# Patient Record
Sex: MALE | Race: WHITE | NOT HISPANIC OR LATINO | Employment: OTHER | URBAN - METROPOLITAN AREA
[De-identification: names, ages, dates, MRNs, and addresses within clinical notes are randomized per-mention and may not be internally consistent; named-entity substitution may affect disease eponyms.]

---

## 2023-10-03 ENCOUNTER — ANCILLARY PROCEDURE (OUTPATIENT)
Dept: RADIOLOGY | Facility: CLINIC | Age: 68
End: 2023-10-03
Payer: MEDICARE

## 2023-10-03 DIAGNOSIS — M23.92 UNSPECIFIED INTERNAL DERANGEMENT OF LEFT KNEE: ICD-10-CM

## 2023-10-03 PROCEDURE — 73721 MRI JNT OF LWR EXTRE W/O DYE: CPT | Mod: LT

## 2023-10-03 PROCEDURE — 73721 MRI JNT OF LWR EXTRE W/O DYE: CPT | Mod: LEFT SIDE | Performed by: RADIOLOGY

## 2023-10-07 PROBLEM — R59.0 CERVICAL LYMPHADENOPATHY: Status: ACTIVE | Noted: 2023-10-07

## 2023-10-07 PROBLEM — E04.1 THYROID NODULE: Status: ACTIVE | Noted: 2023-08-07

## 2023-10-07 PROBLEM — K11.20 SUBMANDIBULAR SIALOADENITIS: Status: ACTIVE | Noted: 2023-10-07

## 2023-10-07 PROBLEM — N40.0 BENIGN PROSTATIC HYPERPLASIA WITHOUT LOWER URINARY TRACT SYMPTOMS: Status: ACTIVE | Noted: 2023-08-07

## 2023-10-07 PROBLEM — M79.672 MECHANICAL PAIN OF LEFT FOOT: Status: ACTIVE | Noted: 2023-10-07

## 2023-10-07 PROBLEM — M72.2 PLANTAR FASCIITIS, LEFT: Status: ACTIVE | Noted: 2023-10-07

## 2023-10-07 PROBLEM — R31.9 HEMATURIA: Status: ACTIVE | Noted: 2023-08-07

## 2023-10-07 PROBLEM — J30.9 ALLERGIC RHINITIS: Status: ACTIVE | Noted: 2023-08-07

## 2023-10-07 PROBLEM — E78.2 MIXED HYPERLIPIDEMIA: Status: ACTIVE | Noted: 2023-08-07

## 2023-10-07 PROBLEM — F51.01 PRIMARY INSOMNIA: Status: ACTIVE | Noted: 2023-08-07

## 2023-10-07 RX ORDER — LEVOTHYROXINE SODIUM 75 UG/1
TABLET ORAL
COMMUNITY
Start: 2020-07-05 | End: 2023-10-25 | Stop reason: ALTCHOICE

## 2023-10-07 RX ORDER — TAMSULOSIN HYDROCHLORIDE 0.4 MG/1
0.4 CAPSULE ORAL DAILY
COMMUNITY
Start: 2017-08-31 | End: 2023-10-25 | Stop reason: ALTCHOICE

## 2023-10-07 RX ORDER — ZOLPIDEM TARTRATE 12.5 MG/1
TABLET, COATED ORAL
COMMUNITY
End: 2023-11-01 | Stop reason: WASHOUT

## 2023-10-07 RX ORDER — IBUPROFEN 800 MG/1
TABLET ORAL
COMMUNITY
Start: 2023-09-06

## 2023-10-09 ENCOUNTER — TRANSCRIBE ORDERS (OUTPATIENT)
Dept: OPERATING ROOM | Facility: HOSPITAL | Age: 68
End: 2023-10-09

## 2023-10-09 ENCOUNTER — OFFICE VISIT (OUTPATIENT)
Dept: ORTHOPEDIC SURGERY | Facility: CLINIC | Age: 68
End: 2023-10-09
Payer: MEDICARE

## 2023-10-09 ENCOUNTER — TRANSCRIBE ORDERS (OUTPATIENT)
Dept: PREADMISSION TESTING | Facility: HOSPITAL | Age: 68
End: 2023-10-09

## 2023-10-09 VITALS — HEIGHT: 71 IN | BODY MASS INDEX: 25.2 KG/M2 | WEIGHT: 180 LBS

## 2023-10-09 DIAGNOSIS — S83.242A OTHER TEAR OF MEDIAL MENISCUS, CURRENT INJURY, LEFT KNEE, INITIAL ENCOUNTER: Primary | ICD-10-CM

## 2023-10-09 DIAGNOSIS — S83.282S ACUTE LATERAL MENISCUS TEAR OF LEFT KNEE, SEQUELA: ICD-10-CM

## 2023-10-09 DIAGNOSIS — S83.282A OTHER TEAR OF LATERAL MENISCUS, CURRENT INJURY, LEFT KNEE, INITIAL ENCOUNTER: ICD-10-CM

## 2023-10-09 DIAGNOSIS — S83.242S ACUTE MEDIAL MENISCUS TEAR OF LEFT KNEE, SEQUELA: ICD-10-CM

## 2023-10-09 PROCEDURE — 99214 OFFICE O/P EST MOD 30 MIN: CPT | Performed by: ORTHOPAEDIC SURGERY

## 2023-10-09 PROCEDURE — 99214 OFFICE O/P EST MOD 30 MIN: CPT | Mod: PO | Performed by: ORTHOPAEDIC SURGERY

## 2023-10-09 PROCEDURE — 1159F MED LIST DOCD IN RCRD: CPT | Performed by: ORTHOPAEDIC SURGERY

## 2023-10-09 PROCEDURE — 1126F AMNT PAIN NOTED NONE PRSNT: CPT | Performed by: ORTHOPAEDIC SURGERY

## 2023-10-09 PROCEDURE — 1036F TOBACCO NON-USER: CPT | Performed by: ORTHOPAEDIC SURGERY

## 2023-10-09 NOTE — PROGRESS NOTES
History: Shaan is here for recheck of his left knee.  He is still having pain and mechanical symptoms.  Anytime he twists or turns he feels something catch in the inside of the knee.  He feels very similar to his previous right knee meniscus tear.  He did obtain his MRI.    Past medical history: Multiple  Medications: Multiple  Allergies: No known drug allergies    Please refer to the intake H&P regarding the patient's review of systems, family history and social history as was done today    HEENT: Normal  Lungs: Clear to auscultation  Heart: RRR  Abdomen: Soft, nontender  Skin: clear  Extremity: He has tenderness again medially.  He has a positive medial Dang's maneuver.  1+ crepitus with range of motion.  Very slight pain laterally.  Mild swelling also noted.  No numbness or tingling.  Contralateral exam is normal for strength, motion, stability and neurovascular assessment.    Radiographs: MRI was reviewed showing a medial meniscal tear as well as a questionable small lateral meniscal tear.  There is a fairly large effusion as well as some small areas of grade 4 chondral change beneath the patella and some wear medially.    Assessment: Left knee medial meniscal tear and possible lateral meniscal tear, left knee DJD    Plan: We discussed that he does have several areas of arthritis but also has a medial meniscal tear.  Most of his symptoms seem to be mechanical in nature and he would like to proceed with a diagnostic arthroscopy and medial meniscectomy.  We can certainly evaluate his lateral meniscus and chondral structures as well.  Risk benefits were noted and we will see him back preoperatively.  He understands the 4 to 6-week healing time frame with surgery having gone through it on the right side.    This note was generated with voice recognition software and may contain grammatical errors.

## 2023-10-18 PROBLEM — S83.282A OTHER TEAR OF LATERAL MENISCUS, CURRENT INJURY, LEFT KNEE, INITIAL ENCOUNTER: Status: ACTIVE | Noted: 2023-10-09

## 2023-10-18 PROBLEM — S83.242A OTHER TEAR OF MEDIAL MENISCUS, CURRENT INJURY, LEFT KNEE, INITIAL ENCOUNTER: Status: ACTIVE | Noted: 2023-10-09

## 2023-10-24 NOTE — PROGRESS NOTES
"Physical Therapy    Physical Therapy Evaluation and Treatment      Patient Name: Shaan Beth  MRN: [id number]  Today's Date: 10/25/2023    Insurance: Medicare  Allowed visits: subject to $2230 cap    Subjective  Patient to have left knee menisectomy 11/2/23. Patient was playing pickle ball and noticed swelling and pain in his left knee. This occurred 2 months ago. He has had a knee arthroscopy in his right knee in the past. Chief complaint currently is \"it's pretty painful, it's constant pain\". He can ride a bike but \"walking, hiking, or any twisting\" increases his pain. He denies locking or giving out. Biggest limitation is \"twisting or pivoting\". Exacerbating factors include WB activities, kneeling, and pivoting. Relieving factors include use of ibuprofen and use of brace. He takes Tylenol prn. He was 100% functional prior to onset of symptoms; he did have some patellofemoral symptoms prior to injury. PMH is positive for right knee arthroscopy, right shoulder arthroscopy, and OA.   Referring physician: Chacho Rodriguez MD - F/U following this session  PCP: Hung Urban MD    Living environment: multi-story home. 2 steps into home. Bedroom and bathroom on first floor. Lives with his wife. He does have crutches but does not have a WW or a SPC.   Work: retired    Patient-specific goal: \"to be pain free to play pickle ball and hike again. He would like to be active again.     Objective  Precautions: none    Observation: some inflammation evident surrounding left knee.     Gait Assessment: ambulated into clinic without an assistive device with slight decreased stance time through left LE.     Assessment  67 yo male with 2 month history of present condition and presence of 3 personal factors and/or comorbidities that impact the plan of care including age of 68 and PMH of right knee arthroscopy, right shoulder arthroscopy, and OA presents pre-operatively with left knee pain, decreased AROM, difficulty walking, and " inability to participate in recreational activities effecting the following body structures and functions: left LE body region and musculoskeletal body system including the left knee. Activity limitations and participation restrictions include decreased tolerance to position changes and walking on uneven surfaces. Shaan will therefore benefit from post-op PT management to establish a HEP and promote safe healing. The clinical presentation of this patient is evolving and their history and examination findings are consistent with a moderate complexity evaluation. Good potential.    Treatment provided today: Initial evaluation completed. Discussed objective findings and goals of skilled care. Provided information regarding upcoming surgical intervention including handouts outlining procedure, appropriate post-operative exercises, signs and symptoms of infection, and post-operative management of pain. Instructed patient in proper gait mechanics on level surface and stairs using bilateral axillary crutches, both WBAT and NWB techniques. Answered all questions for patient.     93325 - 15 minutes untimed, 1 unit  57796 - 15 minutes, 1 unit    Plan  Shaan will be placed on hold for therapy until after completion of surgical procedure. Upon return to therapy per MD discretion, patient's status will be re-evaluated.   Plans to attend [facility] for outpatient PT if so ordered.

## 2023-10-25 ENCOUNTER — OFFICE VISIT (OUTPATIENT)
Dept: ORTHOPEDIC SURGERY | Facility: CLINIC | Age: 68
End: 2023-10-25
Payer: MEDICARE

## 2023-10-25 ENCOUNTER — EVALUATION (OUTPATIENT)
Dept: PHYSICAL THERAPY | Facility: CLINIC | Age: 68
End: 2023-10-25
Payer: MEDICARE

## 2023-10-25 DIAGNOSIS — M25.562 LEFT KNEE PAIN, UNSPECIFIED CHRONICITY: Primary | ICD-10-CM

## 2023-10-25 DIAGNOSIS — S83.207D TEAR OF MENISCUS OF LEFT KNEE AS CURRENT INJURY, UNSPECIFIED MENISCUS, UNSPECIFIED TEAR TYPE, SUBSEQUENT ENCOUNTER: ICD-10-CM

## 2023-10-25 DIAGNOSIS — S83.282S ACUTE LATERAL MENISCUS TEAR OF LEFT KNEE, SEQUELA: Primary | ICD-10-CM

## 2023-10-25 DIAGNOSIS — G89.18 POST-OPERATIVE PAIN: ICD-10-CM

## 2023-10-25 DIAGNOSIS — S83.242S ACUTE MEDIAL MENISCUS TEAR OF LEFT KNEE, SEQUELA: ICD-10-CM

## 2023-10-25 PROCEDURE — 99024 POSTOP FOLLOW-UP VISIT: CPT | Performed by: PHYSICIAN ASSISTANT

## 2023-10-25 PROCEDURE — 1036F TOBACCO NON-USER: CPT | Performed by: PHYSICIAN ASSISTANT

## 2023-10-25 PROCEDURE — 97162 PT EVAL MOD COMPLEX 30 MIN: CPT | Mod: GP | Performed by: PHYSICAL THERAPIST

## 2023-10-25 PROCEDURE — 1126F AMNT PAIN NOTED NONE PRSNT: CPT | Performed by: PHYSICIAN ASSISTANT

## 2023-10-25 PROCEDURE — E0114 CRUTCH UNDERARM PAIR NO WOOD: HCPCS | Performed by: PHYSICIAN ASSISTANT

## 2023-10-25 PROCEDURE — 97535 SELF CARE MNGMENT TRAINING: CPT | Mod: GP | Performed by: PHYSICAL THERAPIST

## 2023-10-25 PROCEDURE — 1159F MED LIST DOCD IN RCRD: CPT | Performed by: PHYSICIAN ASSISTANT

## 2023-10-25 RX ORDER — SODIUM CHLORIDE, SODIUM LACTATE, POTASSIUM CHLORIDE, CALCIUM CHLORIDE 600; 310; 30; 20 MG/100ML; MG/100ML; MG/100ML; MG/100ML
100 INJECTION, SOLUTION INTRAVENOUS CONTINUOUS
Status: CANCELLED | OUTPATIENT
Start: 2023-10-25

## 2023-10-25 RX ORDER — HYDROCODONE BITARTRATE AND ACETAMINOPHEN 5; 325 MG/1; MG/1
1 TABLET ORAL EVERY 6 HOURS PRN
Qty: 12 TABLET | Refills: 0 | Status: SHIPPED
Start: 2023-10-25 | End: 2023-11-02 | Stop reason: HOSPADM

## 2023-10-25 ASSESSMENT — PAIN SCALES - GENERAL: PAINLEVEL_OUTOF10: 2

## 2023-10-25 ASSESSMENT — PATIENT HEALTH QUESTIONNAIRE - PHQ9
2. FEELING DOWN, DEPRESSED OR HOPELESS: NOT AT ALL
1. LITTLE INTEREST OR PLEASURE IN DOING THINGS: NOT AT ALL
SUM OF ALL RESPONSES TO PHQ9 QUESTIONS 1 AND 2: 0

## 2023-10-25 ASSESSMENT — ENCOUNTER SYMPTOMS
LOSS OF SENSATION IN FEET: 0
DEPRESSION: 0
OCCASIONAL FEELINGS OF UNSTEADINESS: 0

## 2023-10-25 ASSESSMENT — PAIN DESCRIPTION - DESCRIPTORS: DESCRIPTORS: SHARP

## 2023-10-25 NOTE — LETTER
October 25, 2023     Patient: Shaan Beth   YOB: 1955   Date of Visit: 10/25/2023       To Whom it May Concern:    Shaan Beth was seen in my clinic on 10/25/2023. He {Return to school/sport:04132}.    If you have any questions or concerns, please don't hesitate to call.         Sincerely,          Ceci Molina, HENRRYT        CC: No Recipients

## 2023-10-25 NOTE — PROGRESS NOTES
Shaan Beth is here today for a pre-op visit of his left knee.  He is scheduled for a left knee partial medial meniscectomy and possible partial lateral meniscectomy.    This is a pre-op visit to discuss the risks and benefits of surgery. The risks and benefits were fully explained to the patient. The patient wishes to proceed.     With any surgery, infection is a risk; usually 1-2%. It can become higher for individuals with diabetes, rheumatoid arthritis, previous surgery, individuals on oral steroids, or obese individuals. All of these issues were properly addressed and considered. Reassured all sterile techniques would be followed.  Severe infections may require removal of any prosthesis.    The patient will be given preop antibiotics. It was also explained to the patient that there will be some blood loss during the procedure but that blood transfusion is usually not necessary. It is also noted that with knee surgery a tourniquet is applied to lower the amount of blood loss during the case.    Pulmonary embolism and blood clots were also discussed with the patient and told that these can have fatal complications. It is explained to the patient that the use of MIKHAIL hose, foot pumps, incentive spirometers, quick mobility and the use of blood thinners/Aspirin for a period of 21-28 days is the standard preventative care.     It was explained that surgery is often used to decrease pain, provide stability, and improve strength but individuals will have varying results postoperatively. There can be variation in motion and a risk of stiffness. It is also stated that occasionally we will have to manipulate an extremity if pain and stiffness persist. We also discussed there are limitations with some motions after certain surgeries.     Fracture, though rare, may also occur intraoperatively. There is also the possibility of nerve or vascular injuries as well. There is potential for continued numbness or tingling. The  benefits of anesthesia were explained to the patient.     All of the patient's questions were answered.     Results/Imaging: MRI showed a medial meniscus tear as well as a questionable small lateral meniscus tear.    Assessment: Left knee medial meniscus tear and possible lateral meniscus tear.    Plan:   I have personally reviewed the OARRS report for this patient. This report is scanned into the electronic medical record. I have considered the risks of abuse, dependence, addiction, and diversion. The patient's current pain level is 5.  Post operative pain medication was sent to the patient's pharmacy.  The patient will not begin taking this until after surgery.     He will follow up 1 week post op.    This note was generated with voice recognition software and may contain grammatical errors.

## 2023-10-25 NOTE — LETTER
October 25, 2023     Patient: Shaan Beth   YOB: 1955   Date of Visit: 10/25/2023       To Whom It May Concern:    It is my medical opinion that Shaan Beth {Work release (duty restriction):82085}.    If you have any questions or concerns, please don't hesitate to call.         Sincerely,        Ceci Molina, HENRRYT    CC: No Recipients

## 2023-10-30 ENCOUNTER — HOSPITAL ENCOUNTER (OUTPATIENT)
Dept: CARDIOLOGY | Facility: CLINIC | Age: 68
Discharge: HOME | End: 2023-10-30
Payer: MEDICARE

## 2023-10-30 DIAGNOSIS — S83.282A OTHER TEAR OF LATERAL MENISCUS, CURRENT INJURY, LEFT KNEE, INITIAL ENCOUNTER: ICD-10-CM

## 2023-10-30 DIAGNOSIS — S83.242A OTHER TEAR OF MEDIAL MENISCUS, CURRENT INJURY, LEFT KNEE, INITIAL ENCOUNTER: ICD-10-CM

## 2023-10-30 PROCEDURE — 93005 ELECTROCARDIOGRAM TRACING: CPT

## 2023-10-30 PROCEDURE — 93010 ELECTROCARDIOGRAM REPORT: CPT | Performed by: INTERNAL MEDICINE

## 2023-11-01 ENCOUNTER — APPOINTMENT (OUTPATIENT)
Dept: CARDIOLOGY | Facility: HOSPITAL | Age: 68
End: 2023-11-01
Payer: MEDICARE

## 2023-11-01 RX ORDER — ACETAMINOPHEN 500 MG
500 TABLET ORAL EVERY 6 HOURS PRN
COMMUNITY

## 2023-11-01 RX ORDER — ZOLPIDEM TARTRATE 12.5 MG/1
6 TABLET, FILM COATED, EXTENDED RELEASE ORAL NIGHTLY PRN
COMMUNITY

## 2023-11-02 ENCOUNTER — PREP FOR PROCEDURE (OUTPATIENT)
Dept: OPERATING ROOM | Facility: HOSPITAL | Age: 68
End: 2023-11-02

## 2023-11-02 ENCOUNTER — ANESTHESIA (OUTPATIENT)
Dept: OPERATING ROOM | Facility: HOSPITAL | Age: 68
End: 2023-11-02
Payer: MEDICARE

## 2023-11-02 ENCOUNTER — ANESTHESIA EVENT (OUTPATIENT)
Dept: OPERATING ROOM | Facility: HOSPITAL | Age: 68
End: 2023-11-02
Payer: MEDICARE

## 2023-11-02 ENCOUNTER — HOSPITAL ENCOUNTER (OUTPATIENT)
Facility: HOSPITAL | Age: 68
Setting detail: OUTPATIENT SURGERY
Discharge: HOME | End: 2023-11-02
Attending: ORTHOPAEDIC SURGERY | Admitting: ORTHOPAEDIC SURGERY
Payer: MEDICARE

## 2023-11-02 VITALS
DIASTOLIC BLOOD PRESSURE: 70 MMHG | OXYGEN SATURATION: 98 % | HEART RATE: 43 BPM | RESPIRATION RATE: 16 BRPM | HEIGHT: 72 IN | WEIGHT: 179.01 LBS | TEMPERATURE: 96.6 F | SYSTOLIC BLOOD PRESSURE: 125 MMHG | BODY MASS INDEX: 24.25 KG/M2

## 2023-11-02 DIAGNOSIS — S83.242A OTHER TEAR OF MEDIAL MENISCUS, CURRENT INJURY, LEFT KNEE, INITIAL ENCOUNTER: ICD-10-CM

## 2023-11-02 DIAGNOSIS — S83.282S ACUTE LATERAL MENISCUS TEAR OF LEFT KNEE, SEQUELA: Primary | ICD-10-CM

## 2023-11-02 LAB
ATRIAL RATE: 47 BPM
P AXIS: 42 DEGREES
P OFFSET: 189 MS
P ONSET: 139 MS
PR INTERVAL: 174 MS
Q ONSET: 226 MS
QRS COUNT: 8 BEATS
QRS DURATION: 90 MS
QT INTERVAL: 404 MS
QTC CALCULATION(BAZETT): 357 MS
QTC FREDERICIA: 372 MS
R AXIS: -16 DEGREES
T AXIS: 63 DEGREES
T OFFSET: 428 MS
VENTRICULAR RATE: 47 BPM

## 2023-11-02 PROCEDURE — 2500000005 HC RX 250 GENERAL PHARMACY W/O HCPCS: Performed by: NURSE ANESTHETIST, CERTIFIED REGISTERED

## 2023-11-02 PROCEDURE — 2720000007 HC OR 272 NO HCPCS: Performed by: ORTHOPAEDIC SURGERY

## 2023-11-02 PROCEDURE — 3700000002 HC GENERAL ANESTHESIA TIME - EACH INCREMENTAL 1 MINUTE: Performed by: ORTHOPAEDIC SURGERY

## 2023-11-02 PROCEDURE — 2500000004 HC RX 250 GENERAL PHARMACY W/ HCPCS (ALT 636 FOR OP/ED): Mod: AU | Performed by: ORTHOPAEDIC SURGERY

## 2023-11-02 PROCEDURE — A4217 STERILE WATER/SALINE, 500 ML: HCPCS | Mod: AU | Performed by: ORTHOPAEDIC SURGERY

## 2023-11-02 PROCEDURE — 2500000004 HC RX 250 GENERAL PHARMACY W/ HCPCS (ALT 636 FOR OP/ED): Performed by: PHYSICIAN ASSISTANT

## 2023-11-02 PROCEDURE — 29881 ARTHRS KNE SRG MNISECTMY M/L: CPT | Performed by: ORTHOPAEDIC SURGERY

## 2023-11-02 PROCEDURE — 7100000002 HC RECOVERY ROOM TIME - EACH INCREMENTAL 1 MINUTE: Performed by: ORTHOPAEDIC SURGERY

## 2023-11-02 PROCEDURE — 2500000004 HC RX 250 GENERAL PHARMACY W/ HCPCS (ALT 636 FOR OP/ED): Performed by: NURSE ANESTHETIST, CERTIFIED REGISTERED

## 2023-11-02 PROCEDURE — 7100000010 HC PHASE TWO TIME - EACH INCREMENTAL 1 MINUTE: Performed by: ORTHOPAEDIC SURGERY

## 2023-11-02 PROCEDURE — 7100000009 HC PHASE TWO TIME - INITIAL BASE CHARGE: Performed by: ORTHOPAEDIC SURGERY

## 2023-11-02 PROCEDURE — 7100000001 HC RECOVERY ROOM TIME - INITIAL BASE CHARGE: Performed by: ORTHOPAEDIC SURGERY

## 2023-11-02 PROCEDURE — 3700000001 HC GENERAL ANESTHESIA TIME - INITIAL BASE CHARGE: Performed by: ORTHOPAEDIC SURGERY

## 2023-11-02 PROCEDURE — 3600000009 HC OR TIME - EACH INCREMENTAL 1 MINUTE - PROCEDURE LEVEL FOUR: Performed by: ORTHOPAEDIC SURGERY

## 2023-11-02 PROCEDURE — 2500000001 HC RX 250 WO HCPCS SELF ADMINISTERED DRUGS (ALT 637 FOR MEDICARE OP): Performed by: STUDENT IN AN ORGANIZED HEALTH CARE EDUCATION/TRAINING PROGRAM

## 2023-11-02 PROCEDURE — 2500000005 HC RX 250 GENERAL PHARMACY W/O HCPCS: Performed by: ORTHOPAEDIC SURGERY

## 2023-11-02 PROCEDURE — 3600000004 HC OR TIME - INITIAL BASE CHARGE - PROCEDURE LEVEL FOUR: Performed by: ORTHOPAEDIC SURGERY

## 2023-11-02 PROCEDURE — 29875 ARTHRS KNEE SURG SYNVCT LMTD: CPT | Performed by: ORTHOPAEDIC SURGERY

## 2023-11-02 RX ORDER — CEFAZOLIN SODIUM 2 G/100ML
2 INJECTION, SOLUTION INTRAVENOUS ONCE
Status: COMPLETED | OUTPATIENT
Start: 2023-11-02 | End: 2023-11-02

## 2023-11-02 RX ORDER — LIDOCAINE HYDROCHLORIDE 10 MG/ML
INJECTION INFILTRATION; PERINEURAL AS NEEDED
Status: DISCONTINUED | OUTPATIENT
Start: 2023-11-02 | End: 2023-11-02 | Stop reason: HOSPADM

## 2023-11-02 RX ORDER — LABETALOL HYDROCHLORIDE 5 MG/ML
5 INJECTION, SOLUTION INTRAVENOUS ONCE AS NEEDED
Status: DISCONTINUED | OUTPATIENT
Start: 2023-11-02 | End: 2023-11-02 | Stop reason: HOSPADM

## 2023-11-02 RX ORDER — KETOROLAC TROMETHAMINE 30 MG/ML
INJECTION, SOLUTION INTRAMUSCULAR; INTRAVENOUS AS NEEDED
Status: DISCONTINUED | OUTPATIENT
Start: 2023-11-02 | End: 2023-11-02

## 2023-11-02 RX ORDER — MIDAZOLAM HYDROCHLORIDE 1 MG/ML
INJECTION, SOLUTION INTRAMUSCULAR; INTRAVENOUS AS NEEDED
Status: DISCONTINUED | OUTPATIENT
Start: 2023-11-02 | End: 2023-11-02

## 2023-11-02 RX ORDER — FENTANYL CITRATE 50 UG/ML
50 INJECTION, SOLUTION INTRAMUSCULAR; INTRAVENOUS EVERY 5 MIN PRN
Status: DISCONTINUED | OUTPATIENT
Start: 2023-11-02 | End: 2023-11-02 | Stop reason: HOSPADM

## 2023-11-02 RX ORDER — LIDOCAINE HYDROCHLORIDE 20 MG/ML
INJECTION, SOLUTION INFILTRATION; PERINEURAL AS NEEDED
Status: DISCONTINUED | OUTPATIENT
Start: 2023-11-02 | End: 2023-11-02

## 2023-11-02 RX ORDER — ONDANSETRON HYDROCHLORIDE 2 MG/ML
INJECTION, SOLUTION INTRAVENOUS AS NEEDED
Status: DISCONTINUED | OUTPATIENT
Start: 2023-11-02 | End: 2023-11-02

## 2023-11-02 RX ORDER — OXYCODONE HYDROCHLORIDE 5 MG/1
5 TABLET ORAL EVERY 4 HOURS PRN
Status: DISCONTINUED | OUTPATIENT
Start: 2023-11-02 | End: 2023-11-02

## 2023-11-02 RX ORDER — PROPOFOL 10 MG/ML
INJECTION, EMULSION INTRAVENOUS AS NEEDED
Status: DISCONTINUED | OUTPATIENT
Start: 2023-11-02 | End: 2023-11-02

## 2023-11-02 RX ORDER — FENTANYL CITRATE 50 UG/ML
INJECTION, SOLUTION INTRAMUSCULAR; INTRAVENOUS AS NEEDED
Status: DISCONTINUED | OUTPATIENT
Start: 2023-11-02 | End: 2023-11-02

## 2023-11-02 RX ORDER — SODIUM CHLORIDE 0.9 G/100ML
IRRIGANT IRRIGATION AS NEEDED
Status: DISCONTINUED | OUTPATIENT
Start: 2023-11-02 | End: 2023-11-02 | Stop reason: HOSPADM

## 2023-11-02 RX ORDER — DROPERIDOL 2.5 MG/ML
0.62 INJECTION, SOLUTION INTRAMUSCULAR; INTRAVENOUS ONCE AS NEEDED
Status: DISCONTINUED | OUTPATIENT
Start: 2023-11-02 | End: 2023-11-02 | Stop reason: HOSPADM

## 2023-11-02 RX ORDER — SODIUM CHLORIDE, SODIUM LACTATE, POTASSIUM CHLORIDE, CALCIUM CHLORIDE 600; 310; 30; 20 MG/100ML; MG/100ML; MG/100ML; MG/100ML
100 INJECTION, SOLUTION INTRAVENOUS CONTINUOUS
Status: DISCONTINUED | OUTPATIENT
Start: 2023-11-02 | End: 2023-11-02 | Stop reason: HOSPADM

## 2023-11-02 RX ORDER — DIPHENHYDRAMINE HYDROCHLORIDE 50 MG/ML
12.5 INJECTION INTRAMUSCULAR; INTRAVENOUS ONCE AS NEEDED
Status: DISCONTINUED | OUTPATIENT
Start: 2023-11-02 | End: 2023-11-02 | Stop reason: HOSPADM

## 2023-11-02 RX ORDER — MEPERIDINE HYDROCHLORIDE 25 MG/ML
12.5 INJECTION INTRAMUSCULAR; INTRAVENOUS; SUBCUTANEOUS EVERY 10 MIN PRN
Status: DISCONTINUED | OUTPATIENT
Start: 2023-11-02 | End: 2023-11-02 | Stop reason: HOSPADM

## 2023-11-02 RX ORDER — LIDOCAINE HYDROCHLORIDE 10 MG/ML
0.1 INJECTION, SOLUTION EPIDURAL; INFILTRATION; INTRACAUDAL; PERINEURAL ONCE
Status: CANCELLED | OUTPATIENT
Start: 2023-11-02 | End: 2023-11-02

## 2023-11-02 RX ADMIN — ONDANSETRON 4 MG: 2 INJECTION INTRAMUSCULAR; INTRAVENOUS at 09:13

## 2023-11-02 RX ADMIN — LIDOCAINE HYDROCHLORIDE 60 MG: 20 INJECTION, SOLUTION INFILTRATION; PERINEURAL at 09:13

## 2023-11-02 RX ADMIN — OXYCODONE HYDROCHLORIDE 5 MG: 5 TABLET ORAL at 10:51

## 2023-11-02 RX ADMIN — MIDAZOLAM 2 MG: 1 INJECTION INTRAMUSCULAR; INTRAVENOUS at 09:07

## 2023-11-02 RX ADMIN — FENTANYL CITRATE 25 MCG: 50 INJECTION, SOLUTION INTRAMUSCULAR; INTRAVENOUS at 09:28

## 2023-11-02 RX ADMIN — KETOROLAC TROMETHAMINE 15 MG: 30 INJECTION, SOLUTION INTRAMUSCULAR at 09:42

## 2023-11-02 RX ADMIN — CEFAZOLIN SODIUM 2 G: 2 INJECTION, SOLUTION INTRAVENOUS at 09:11

## 2023-11-02 RX ADMIN — PROPOFOL 150 MG: 10 INJECTION, EMULSION INTRAVENOUS at 09:13

## 2023-11-02 RX ADMIN — SODIUM CHLORIDE, POTASSIUM CHLORIDE, SODIUM LACTATE AND CALCIUM CHLORIDE 100 ML/HR: 600; 310; 30; 20 INJECTION, SOLUTION INTRAVENOUS at 08:07

## 2023-11-02 ASSESSMENT — PAIN - FUNCTIONAL ASSESSMENT
PAIN_FUNCTIONAL_ASSESSMENT: 0-10

## 2023-11-02 ASSESSMENT — PAIN DESCRIPTION - DESCRIPTORS
DESCRIPTORS: SHARP
DESCRIPTORS: ACHING
DESCRIPTORS: SORE

## 2023-11-02 ASSESSMENT — PAIN SCALES - GENERAL
PAINLEVEL_OUTOF10: 2
PAINLEVEL_OUTOF10: 4
PAINLEVEL_OUTOF10: 4
PAINLEVEL_OUTOF10: 2
PAINLEVEL_OUTOF10: 2
PAIN_LEVEL: 0

## 2023-11-02 ASSESSMENT — COLUMBIA-SUICIDE SEVERITY RATING SCALE - C-SSRS
2. HAVE YOU ACTUALLY HAD ANY THOUGHTS OF KILLING YOURSELF?: NO
6. HAVE YOU EVER DONE ANYTHING, STARTED TO DO ANYTHING, OR PREPARED TO DO ANYTHING TO END YOUR LIFE?: NO
1. IN THE PAST MONTH, HAVE YOU WISHED YOU WERE DEAD OR WISHED YOU COULD GO TO SLEEP AND NOT WAKE UP?: NO

## 2023-11-02 NOTE — OP NOTE
LEFT MEDIAL AND LATERAL MENISCECTOMY, CHONRODPLASTY (L) Operative Note    Preop diagnosis: Left knee medial meniscal tear and DJD    Postop diagnosis: Same    Procedure:   Left knee arthroscopy with partial medial meniscectomy  Left knee arthroscopic chondroplasty of patellofemoral joint  Left knee arthroscopic limited synovectomy of large medial plica    Surgeon: Chacho Rodriguez MD  Assistant: Bernie Capellan PA-C      Findings: see procedure details    EBL: minimal    Procedure Details:   Indications: The patient was noted to have a medial meniscal tear.  The patient also had underlying arthritic change.  The patient had failed multiple conservative measures and elected to proceed with diagnostic arthroscopy and meniscectomy.  The risks and benefits of surgical intervention were noted with the patient and family.  These include infection, stiffness, nerve damage, continued pain as well as need for surgical operations.  Specifically the failure of arthroscopy to treat any of the underlying arthritic condition was noted preoperatively.  Informed consent was obtained prior to arrival in the operating.    Procedure: Upon arrival the patient was verified and was transported from a stretcher to the operative table and placed in a supine position.  An LMA was admitted by the anesthesia staff.  Intravenous antibiotics were given prior to the start of the case.  No tourniquet was used during the procedure.  The left leg was prepped and draped in usual sterile fashion.  A standard inferior lateral arthroscopy portal was established and the arthroscope inserted into the suprasellar space.  The patella had some grade 1 change noted throughout the apex.  There is a area of grade 1 change centrally in the trochlea.  The medial femoral condyle had mild grade 1 and 2 change throughout.  There is grade 1 change in the medial tibial plateau.  There is a tear to the posterior medial aspect of the medial meniscus.  Under direct  visualization an inferior medial portal was established.  The medial meniscal tear was dissected back to stable tissue using an up-biting forceps and shaver.  Approximately 20% of the medial meniscus was removed including a portion of the posterior horn attachment.  Final probing showed excellent stability to the remainder the meniscus including the anterior and Meinders of the posterior horn attachment. A gentle chondroplasty was performed medially.  The ACL and PCL were intact.  The posterior compartment was normal.  Lateral femoral condyle had some grade 1 change centrally.  There was some grade 1 change in the lateral tibial plateau. The lateral meniscus was normal.   Attention was directed back to the patellofemoral joint where a gentle chondroplasty was performed again removing all loose fragments.  Additionally there was a large medial plica noted with complete encroachment upon the medial femoral condyle.  As such a limited synovectomy was performed removing the abutting plica.  The knee was copiously irrigated through the shaver and suctioned dry.  Portal sites closed with 3-0 nylon suture.  All sponge and needle counts are correct prior to closure.  Sterile dressing was applied.  He was awoken from the anesthetic and taken to the recovery room in stable condition.    Bernie Capellan PA-C was present throughout the entire case. Given the nature of the disease process and the procedure, a skilled surgical first assistant was necessary during the case. The assistant was necessary to hold retractors and to manipulate the extremity during the procedure. A certified scrub tech was at the back table managing the instruments and supplies for the surgical case.    Complications:  None; patient tolerated the procedure well.    Disposition: PACU - hemodynamically stable.  Condition: stable         Chacho Rodriguez  Phone Number: [phone]

## 2023-11-02 NOTE — ANESTHESIA POSTPROCEDURE EVALUATION
Patient: Shaan Beth    Procedure Summary       Date: 11/02/23 Room / Location: [facility] / [facility]    Anesthesia Start: 0909 Anesthesia Stop: 0955    Procedure: LEFT MEDIAL AND LATERAL MENISCECTOMY, CHONRODPLASTY (Left: Knee) Diagnosis:       Other tear of medial meniscus, current injury, left knee, initial encounter      Other tear of lateral meniscus, current injury, left knee, initial encounter      (Other tear of medial meniscus, current injury, left knee, initial encounter [S83.242A])      (Other tear of lateral meniscus, current injury, left knee, initial encounter [S83.282A])    Surgeons: Chacho Rodriguez MD Responsible Provider: Ryan Marina DO    Anesthesia Type: general ASA Status: 2            Anesthesia Type: general    Vitals Value Taken Time   /83 11/02/23 0955   Temp 36 11/02/23 0955   Pulse 40 11/02/23 0953   Resp 8 11/02/23 0953   SpO2 100 % 11/02/23 0953   Vitals shown include unvalidated device data.    Anesthesia Post Evaluation    Patient location during evaluation: bedside  Patient participation: complete - patient participated  Level of consciousness: awake  Pain score: 0  Pain management: adequate  Airway patency: patent  Cardiovascular status: acceptable  Respiratory status: acceptable  Hydration status: acceptable        There were no known notable events for this encounter.

## 2023-11-02 NOTE — ANESTHESIA PREPROCEDURE EVALUATION
Patient: Shaan Beth    Procedure Information       Date/Time: 11/02/23 0915    Procedure: LEFT MEDIAL AND LATERAL MENISCECTOMY (Left: Knee)    Location: [facility] / [facility]    Surgeons: Chacho Rodriguez MD            Relevant Problems   Anesthesia (within normal limits)      Cardiovascular   (+) Mixed hyperlipidemia      Neuro/Psych   (+) Neuritis of foot      Other   (+) Arthritis of foot   (+) Cervical lymphadenopathy       Clinical information reviewed:   Tobacco  Allergies  Meds   Med Hx  Surg Hx   Fam Hx  Soc Hx        NPO Detail:  NPO/Void Status  Date of Last Liquid: 11/01/23  Time of Last Liquid: 2330  Date of Last Solid: 11/01/23  Time of Last Solid: 2000  Last Intake Type: Clear fluids  Time of Last Void: 0730         Physical Exam    Airway  Mallampati: II     Cardiovascular   Rhythm: regular  Rate: normal     Dental    Pulmonary - normal exam     Abdominal - normal exam             Anesthesia Plan    ASA 2     general     intravenous induction   Postoperative administration of opioids is intended.  Anesthetic plan and risks discussed with patient.

## 2023-11-02 NOTE — H&P (VIEW-ONLY)
Formatting of this note is different from the original.  Images from the original note were not included.    Shaan Beth is a 68 y.o. male  presents with chief complaint of Pre-op Exam (Left knee surgery on 11/2/2023 Dr DANIEL Rodriguez)     HPI:     HPI     URI sx have essentially resolved.  He never needed the ATBx rx'd    Left knee meniscus surgery 11/2/23.  Pt has PAT surgery scheduled tomorrow (at [facility])    SUBJECTIVE:     MEDICATIONS:  Current Outpatient Medications   Medication Instructions    ibuprofen 800 MG tablet TAKE 1 TABLET BY MOUTH EVERY 8 HOURS AS NEEDED WITH FOOD OR MILK    zolpidem CR (AMBIEN CR) 12.5 mg, Oral, Nightly PRN       ALLERGIES:  Allergies   Allergen Reactions    Amoxicillin      Other Reaction(s): GI Upset    Amoxicillin-Pot Clavulanate GI intolerance     Other Reaction(s): GI Upset, upset stomach       PAST MEDICAL HISTORY:  Patient Active Problem List   Diagnosis    Allergic rhinitis    Benign prostatic hyperplasia without lower urinary tract symptoms    Hematuria    Mixed hyperlipidemia (CMS/HCC)    Primary insomnia    Thyroid nodule (CMS/HCC)    Arthritis of foot    Neuritis of foot    Sore throat    Cervical lymphadenopathy    Mechanical pain of left foot    Plantar fasciitis, left    Submandibular sialoadenitis    Acute pain of left knee    Acute non-recurrent maxillary sinusitis     SURGICAL  HISTORY:  Past Surgical History:   Procedure Laterality Date    LASIK  1999    MS ARTHROSCOPY KNEE DIAGNOSTIC W/WO SYNOVIAL BX SPX Right 04/01/2022    REZUM  04/2021    SHOULDER SURGERY Right 2018    THYROID BIOPSY  03/02/2022    TOE SURGERY Left 09/2014    big toe bone spur    TONSILLECTOMY      VASECTOMY      WRIST SURGERY Right 2012       FAMILY HISTORY:  Family History   Problem Relation Name Age of Onset    Heart disease Mother Patricia Beth     Prostate cancer Brother         SOCIAL HISTORY:  Social History     Tobacco Use    Smoking status: Former     Packs/day: 1.00     Years: 10.00      Pack years: 10.00     Types: Cigarettes     Quit date: 2000     Years since quittin.8    Smokeless tobacco: Never    Tobacco comments:     Quit over 20 years   Substance Use Topics    Alcohol use: Never    Drug use: Never     Depression: Not at risk (2023)    PHQ-2     PHQ-2 Score: 0     REVIEW OF SYMPTOMS:     Review of Systems   HENT:  Negative for congestion, ear pain and sore throat.    Eyes:  Negative for visual disturbance.   Respiratory:  Negative for cough, chest tightness and shortness of breath.    Cardiovascular:  Negative for palpitations and leg swelling.   Gastrointestinal:  Negative for abdominal pain, blood in stool, constipation and diarrhea.   Genitourinary:  Negative for difficulty urinating, dysuria and hematuria.   Musculoskeletal:  Negative for arthralgias.   Skin:  Negative for rash.   Neurological:  Negative for dizziness, light-headedness and headaches.   Hematological:  Negative for adenopathy.       OBJECTIVE:     Visit Vitals  /62   Pulse 56   Temp 97.2 °F   Ht 6'   Wt 186 lb   SpO2 97%   BMI 25.23 kg/m²   Smoking Status Former   BSA 2.07 m²     BP Readings from Last 3 Encounters:   10/24/23 102/62   10/13/23 100/70   23 106/62     Wt Readings from Last 3 Encounters:   10/24/23 186 lb   10/13/23 185 lb   23 184 lb       Physical Exam  Vitals and nursing note reviewed.   Constitutional:       General: He is not in acute distress.     Appearance: Normal appearance. He is well-developed.   HENT:      Head: Normocephalic and atraumatic.      Right Ear: Tympanic membrane, ear canal and external ear normal.      Left Ear: Tympanic membrane, ear canal and external ear normal.      Nose: Nose normal.      Mouth/Throat:      Pharynx: Oropharynx is clear. No oropharyngeal exudate or posterior oropharyngeal erythema.   Eyes:      General: Lids are normal.      Extraocular Movements: Extraocular movements intact.      Conjunctiva/sclera: Conjunctivae normal.       Pupils: Pupils are equal, round, and reactive to light.   Neck:      Thyroid: No thyromegaly.      Vascular: No carotid bruit.   Cardiovascular:      Rate and Rhythm: Normal rate and regular rhythm.      Heart sounds: Normal heart sounds. No murmur heard.  Pulmonary:      Effort: Pulmonary effort is normal. No respiratory distress.      Breath sounds: Normal breath sounds. No wheezing, rhonchi or rales.   Abdominal:      General: Bowel sounds are normal. There is no distension.      Palpations: Abdomen is soft. There is no hepatomegaly, splenomegaly or mass.      Tenderness: There is no abdominal tenderness. There is no guarding or rebound.      Hernia: No hernia is present.   Lymphadenopathy:      Cervical: No cervical adenopathy.   Skin:     General: Skin is warm and dry.   Neurological:      General: No focal deficit present.      Mental Status: He is alert and oriented to person, place, and time.   Psychiatric:         Mood and Affect: Mood normal.         Behavior: Behavior normal.         ASSESSMENT AND PLAN:     Assessment/Plan   Problem List Items Addressed This Visit         Nervous    Primary insomnia      He will continue with the Ambien on a p.r.n. basis as before         Genitourinary    Hematuria      He has followed with the urologist as it relates to the same         Musculoskeletal    Acute pain of left knee      He will be having meniscus surgery as noted elsewhere         Endocrine/Metabolic    Thyroid nodule (CMS/HCC)      He will continue to follow with the ENT physician         Other    Allergic rhinitis      He can use over-the-counter medication on a p.r.n. basis       Mixed hyperlipidemia (CMS/HCC)      Patient is interested in working on diet and exercise before starting/adjusting medication. Both were reviewed today and we will readdress at f/u appt         Preoperative examination - Primary      Preoperative testing is scheduled to be done at the hospital.  We will review results of the  same when available.  If unremarkable, patient will be medically cleared based on functional status and lack of cardiac symptoms.  He will hold his ibuprofen for 10 days prior to surgery        Health Maintenance Due   Topic Date Due    Medicare Annual Wellness (AWV)  10/06/2023       Electronically signed by Jonathan Hardin DO at 10/24/2023  9:47 AM EDT

## 2023-11-02 NOTE — DISCHARGE INSTRUCTIONS
Medication given may have significant effects after discharge. Therefore on the day of surgery:  1) you must be accompanied by a responsible adult upon discharge and for 24 hours after surgery.  Do not drive a motor vehicle, operate machinery, power tools or appliance, drink alcoholic beverages, or make critical decisions for 24 hours  2) Be aware of dizziness, which may cause a fall. Change positions slowly.  3) Eating: you may resume your regular diet but it is better to increase intake slowly with mild foods and working up to your regular diet. No greasy, fried or spicy foods today.  4) Nausea/Vomiting: Nausea and vomiting may occur as you become more active or begin to increase food intake. If this should happen, decrease activity and return to liquids. If the problem persists, call your surgeon  5) Pain: Your surgeon may have given you a prescription for pain medication. Take pain medication with food as prescribed. Pain medication may cause constipation, so drink plenty of fluids. If your pain medication does not provide adequate relief, call your surgeon  6) Urinating: Notify your surgeon if you have not urinated within 12 hours after discharge  7) Ice: Apply ice to operative site for 20 min 5-6 times a day or use Polar care as instructed  8) Dressing:   []   Remove dressing in 24hr    [x]  Remove dressing in 48hr   []  Leave open to air after initial dressing is taken off and incision is dry  Do not remove the steri-strips. (no bath/ hot tubs/ pools)   []  Leave dressing in place. Keep dressing/ incision clean and dry    9) Activity    Shoulder/ elbow/Hand   []  Elevate extremity    []  Sling   [] at all times (except for exercises and showering)  [] as needed only for comfort   [] Begin daily motion exercises out of sling as instructed   []  Bend and flex fingers/wrist/elbow frequently   [] other   Knee/ Ankle/ Foot   [] elevate extremity   [x] crutches        [] non-weight bearing to operative extremity   [] partial weight bearing  [x] Full weight bearing as tolerated   []  Use brace whenever walking   [] other      10) Begin physical therapy if advised by you physician:   [] before returning to see you doctor   [x] not until you follow up with your doctor    11) call your doctor at [phone] for an appointment (or follow up as scheduled)    Contact [facility] office if  Increased redness, swelling, drainage of any kind, and/or pain to surgery site.  As well as new onset fevers and or chills.  These could signify an infection.  Calf or thigh tenderness to touch as well as increased swelling or redness.  This could signify a clot formation.  Numbness or tingling to an area around the incision site or below the incision site (toes). Or if the operative extremity becomes cold, blue.  Any rash appears, increased  or new onset nausea/vomiting occur.  This may indicate a reaction to a medication.  Temp is 38.5 C (101F)  12) If you have any concerns or questions, please call [facility] surgeon on call. The 24- hour phone is [phone]  13) If you are unable to contact your surgeon, in an emergency situation, go to the nearest hospital

## 2023-11-02 NOTE — ANESTHESIA PROCEDURE NOTES
Airway  Date/Time: 11/2/2023 9:15 AM  Urgency: elective      Staffing  Performed: CRNA   Authorized by: Ryan Marina DO    Performed by: ARLYN Pacheco-CRNA  Patient location during procedure: OR    Indications and Patient Condition  Indications for airway management: anesthesia  Spontaneous ventilation: present  Sedation level: deep  Preoxygenated: yes  Patient position: sniffing  MILS maintained throughout  Mask difficulty assessment: 0 - not attempted    Final Airway Details  Final airway type: supraglottic airway      Successful airway: classic  Size 4     Number of attempts at approach: 1

## 2023-11-08 ENCOUNTER — OFFICE VISIT (OUTPATIENT)
Dept: ORTHOPEDIC SURGERY | Facility: CLINIC | Age: 68
End: 2023-11-08
Payer: MEDICARE

## 2023-11-08 DIAGNOSIS — M72.2 PLANTAR FASCIITIS, RIGHT: ICD-10-CM

## 2023-11-08 DIAGNOSIS — S83.242D TEAR OF MEDIAL MENISCUS OF LEFT KNEE, CURRENT, UNSPECIFIED TEAR TYPE, SUBSEQUENT ENCOUNTER: Primary | ICD-10-CM

## 2023-11-08 PROCEDURE — 1159F MED LIST DOCD IN RCRD: CPT | Performed by: PHYSICIAN ASSISTANT

## 2023-11-08 PROCEDURE — 1036F TOBACCO NON-USER: CPT | Performed by: PHYSICIAN ASSISTANT

## 2023-11-08 PROCEDURE — 1126F AMNT PAIN NOTED NONE PRSNT: CPT | Performed by: PHYSICIAN ASSISTANT

## 2023-11-08 PROCEDURE — 99024 POSTOP FOLLOW-UP VISIT: CPT | Performed by: PHYSICIAN ASSISTANT

## 2023-11-08 NOTE — PROGRESS NOTES
History of Present Illness: Shaan Beth is here today for a post op visit.  He is one week out from a partial medial menisectomy. He is having normal post-operative soreness. Overall, he is doing well.     Physical Exam: The wounds are healing nicely.  There is no redness, drainage, or warmth.  Swelling and ecchymosis are normal for this stage of healing.  Range of motion is appropriate.  The limb is neurovascularly intact.     Radiographs: None today    Assessment: Stable left knee, status post arthroscopic partial medial menisectomy, one week out.     Plan:   Arthroscopy pictures were reviewed today with the patient.  Sutures were removed. Benzoin and Steri-Strips were applied.   He will continue icing.  He is willing to get started in a gentle therapy program and the order was given today.   He is going to follow up in 3-4 weeks to assess progress.   All questions were answered with the patient.  He is also having some recurrent right foot plantar fasciitis pain which has been bothering him for over a year.  He is going to do a few visits of therapy for his knee and then focus more on the plantar fasciitis protocol.

## 2023-12-06 ENCOUNTER — APPOINTMENT (OUTPATIENT)
Dept: ORTHOPEDIC SURGERY | Facility: CLINIC | Age: 68
End: 2023-12-06
Payer: COMMERCIAL

## 2023-12-11 ENCOUNTER — OFFICE VISIT (OUTPATIENT)
Dept: ORTHOPEDIC SURGERY | Facility: CLINIC | Age: 68
End: 2023-12-11
Payer: MEDICARE

## 2023-12-11 DIAGNOSIS — M72.2 PLANTAR FASCIITIS: ICD-10-CM

## 2023-12-11 DIAGNOSIS — S83.232D COMPLEX TEAR OF MEDIAL MENISCUS OF LEFT KNEE AS CURRENT INJURY, SUBSEQUENT ENCOUNTER: Primary | ICD-10-CM

## 2023-12-11 PROCEDURE — 1159F MED LIST DOCD IN RCRD: CPT | Performed by: ORTHOPAEDIC SURGERY

## 2023-12-11 PROCEDURE — 99024 POSTOP FOLLOW-UP VISIT: CPT | Performed by: ORTHOPAEDIC SURGERY

## 2023-12-11 PROCEDURE — 1036F TOBACCO NON-USER: CPT | Performed by: ORTHOPAEDIC SURGERY

## 2023-12-11 PROCEDURE — 1126F AMNT PAIN NOTED NONE PRSNT: CPT | Performed by: ORTHOPAEDIC SURGERY

## 2023-12-11 NOTE — PROGRESS NOTES
History: He is here for his left knee.  He is 1 month from a partial medial meniscectomy.  He also had a large medial plica that was resected.  He still getting some occasional medial soreness and swelling.  He is also still complaining of right foot pain with Plantar fasciitis.  He has been using his night splint    Exam: Knee exam shows fairly minimal swelling.  He does have slight tenderness toward the medial joint line and medial patella.  No pain laterally.  He has full range of motion.  Wounds are clear.  He has tenderness medial right heel.  There is some pain with plantar fascial stretch.  Otherwise good range of motion of the foot and ankle.    Radiographs: None today    Impression: Stable left knee arthroscopy 1 month out with partial medial meniscectomy and plica excision.  Continued right foot plantar fascia pain.    Plan: We discussed that he may have a bit more soreness given his plica excision.  He is eager to get back to pickleball and other activities but feels he is still a few weeks away.  He will continue to ice and work on exercises.  He would like to get into some formal physical therapy for his plantar fasciitis as is not bothering him for over 6 months.  He is using a gel pad and a night splint already.  If not improved can consider further imaging.  We will see him back over the next month if having difficulties.

## 2023-12-12 ENCOUNTER — OFFICE VISIT (OUTPATIENT)
Dept: ORTHOPEDIC SURGERY | Facility: CLINIC | Age: 68
End: 2023-12-12
Payer: MEDICARE

## 2023-12-12 ENCOUNTER — ANCILLARY PROCEDURE (OUTPATIENT)
Dept: RADIOLOGY | Facility: CLINIC | Age: 68
End: 2023-12-12
Payer: MEDICARE

## 2023-12-12 DIAGNOSIS — M79.642 BILATERAL HAND PAIN: ICD-10-CM

## 2023-12-12 DIAGNOSIS — M19.042 OA (OSTEOARTHRITIS) OF FINGER, LEFT: ICD-10-CM

## 2023-12-12 DIAGNOSIS — M79.641 BILATERAL HAND PAIN: ICD-10-CM

## 2023-12-12 PROCEDURE — 1159F MED LIST DOCD IN RCRD: CPT | Performed by: ORTHOPAEDIC SURGERY

## 2023-12-12 PROCEDURE — 99214 OFFICE O/P EST MOD 30 MIN: CPT | Performed by: ORTHOPAEDIC SURGERY

## 2023-12-12 PROCEDURE — 99214 OFFICE O/P EST MOD 30 MIN: CPT | Mod: PO | Performed by: ORTHOPAEDIC SURGERY

## 2023-12-12 PROCEDURE — 73130 X-RAY EXAM OF HAND: CPT | Mod: BILATERAL PROCEDURE | Performed by: ORTHOPAEDIC SURGERY

## 2023-12-12 PROCEDURE — 1126F AMNT PAIN NOTED NONE PRSNT: CPT | Performed by: ORTHOPAEDIC SURGERY

## 2023-12-12 PROCEDURE — 20600 DRAIN/INJ JOINT/BURSA W/O US: CPT | Performed by: ORTHOPAEDIC SURGERY

## 2023-12-12 PROCEDURE — 2500000005 HC RX 250 GENERAL PHARMACY W/O HCPCS: Mod: PO | Performed by: ORTHOPAEDIC SURGERY

## 2023-12-12 PROCEDURE — 73130 X-RAY EXAM OF HAND: CPT | Mod: 50

## 2023-12-12 PROCEDURE — 20600 DRAIN/INJ JOINT/BURSA W/O US: CPT | Mod: PO | Performed by: ORTHOPAEDIC SURGERY

## 2023-12-12 PROCEDURE — 1036F TOBACCO NON-USER: CPT | Performed by: ORTHOPAEDIC SURGERY

## 2023-12-12 PROCEDURE — 2500000004 HC RX 250 GENERAL PHARMACY W/ HCPCS (ALT 636 FOR OP/ED): Mod: PO | Performed by: ORTHOPAEDIC SURGERY

## 2023-12-12 RX ORDER — LIDOCAINE HYDROCHLORIDE 10 MG/ML
0.5 INJECTION INFILTRATION; PERINEURAL ONCE
Status: COMPLETED | OUTPATIENT
Start: 2023-12-12 | End: 2023-12-12

## 2023-12-12 RX ADMIN — TRIAMCINOLONE ACETONIDE 5 MG: 10 INJECTION, SUSPENSION INTRA-ARTICULAR; INTRALESIONAL at 08:40

## 2023-12-12 RX ADMIN — LIDOCAINE HYDROCHLORIDE 5 MG: 10 INJECTION, SOLUTION INFILTRATION; PERINEURAL at 08:39

## 2023-12-12 NOTE — PROGRESS NOTES
History present illness: Patient presents today for evaluation of pain localized to bilateral Third MCP.  He denies discrete injury.  He is right-hand dominant and otherwise healthy.  This has been especially bad over the last 4 to 5 years.  His left is worse than his right.      Past medical history: The patient's past medical history, family history, social history, and review of systems were documented on the patient medical intake.  The updated data was reviewed in the electronic medical record.  History is negative except otherwise stated in history of present illness.        Physical examination:  General: Alert and oriented to person, place, and time.  No acute distress and breathing comfortably: Pleasant and cooperative with examination.  HEENT: Head is normocephalic and atraumatic.  Neck: Supple, no visible swelling.  Cardiovascular: No palpable tachycardia  Lungs: No audible wheezing or labored breathing  Abdomen: Nondistended.  Extremities: Evaluation of the bilateral upper extremities finds the patient had palpable radial artery at the wrists with brisk capillary refill to all digits.  Patient has intact sensation to axillary radial median and ulnar nerves.  There are no open wounds.  There are no signs of infection.  There is no evidence of lymphedema or lymphatic streaking.  The patient has supple compartments to bilateral arm forearm and hand.  Tenderness and swelling to bilateral third MCP dorsally.  This is worse on the left as compared to the right      Radiology:      Assessment: Bilateral third MCP osteoarthritis      Plan: Treatment options were discussed.  Patient elects for observation on the right and trial of steroid injection on the left.  Plan for follow-up with me in the office in 6 weeks.  No x-rays necessary upon return.        Procedure:  Left third metacarpophalangeal Joint Injection: It was explained to the patient that the risks of a steroid injection include but are not limited to  infection, local skin irritation, skin atrophy, calcification, continued pain and discomfort, elevated blood sugar, burning, failure to relieve pain, and possible late infection. The patient verbalized good insight and verbalized consent for the injection. It was further explained that the post-injection discomfort can be alleviated with additional medications, ice, elevation, and rest over the first 24 hours, and that these modalities are recommended.  After informed consent was provided, patient identification was confirmed, and allergies were verified, the patient was appropriately positioned. The site was marked and time-out performed.    Using aseptic technique, a solution containing 0.5 cc of 5 mg of Kenalog and 0.5 mL of 1% lidocaine without epinephrine was injected intra-articularly to the left third metacarpophalangeal joint. Digital palpation was used to localize the MCP articulation about the dorsal radial aspect of the joint. Gentle traction was then imparted to the long finger distally and a 25-gauge needle was advanced through the skin, subcutaneous tissue and capsule. The injection was then administered and the patient tolerated the injection well. A band-aid was then placed. It should be noted that ethyl chloride spray was used to make the injection delivery more comfortable for the patient.

## 2024-01-23 ENCOUNTER — HOSPITAL ENCOUNTER (OUTPATIENT)
Dept: RADIOLOGY | Facility: HOSPITAL | Age: 69
Discharge: HOME | End: 2024-01-23
Payer: MEDICARE

## 2024-01-23 ENCOUNTER — OFFICE VISIT (OUTPATIENT)
Dept: ORTHOPEDIC SURGERY | Facility: CLINIC | Age: 69
End: 2024-01-23
Payer: MEDICARE

## 2024-01-23 DIAGNOSIS — M54.50 LOW BACK PAIN, UNSPECIFIED: ICD-10-CM

## 2024-01-23 DIAGNOSIS — M54.6 PAIN IN THORACIC SPINE: ICD-10-CM

## 2024-01-23 DIAGNOSIS — M19.049 OSTEOARTHRITIS OF METACARPOPHALANGEAL (MCP) JOINT: Primary | ICD-10-CM

## 2024-01-23 PROCEDURE — 1036F TOBACCO NON-USER: CPT | Performed by: ORTHOPAEDIC SURGERY

## 2024-01-23 PROCEDURE — 1159F MED LIST DOCD IN RCRD: CPT | Performed by: ORTHOPAEDIC SURGERY

## 2024-01-23 PROCEDURE — 72072 X-RAY EXAM THORAC SPINE 3VWS: CPT

## 2024-01-23 PROCEDURE — 1126F AMNT PAIN NOTED NONE PRSNT: CPT | Performed by: ORTHOPAEDIC SURGERY

## 2024-01-23 PROCEDURE — 99213 OFFICE O/P EST LOW 20 MIN: CPT | Performed by: ORTHOPAEDIC SURGERY

## 2024-01-23 PROCEDURE — 72072 X-RAY EXAM THORAC SPINE 3VWS: CPT | Performed by: RADIOLOGY

## 2024-01-23 PROCEDURE — 72110 X-RAY EXAM L-2 SPINE 4/>VWS: CPT

## 2024-01-23 NOTE — PROGRESS NOTES
1/23/2024    Chief Complaint   Patient presents with    Left Hand - Follow-up     Long finger mcp OA s/p inj 12/12/23    Right Hand - Follow-up     Long finger mcp OA       History of Present Illness:  Patient Shaan Beth , 68 y.o. male, presents today, 1/23/2024, for evaluation of bilateral hand pain.  Shaan returns for evaluation of bilateral third metacarpal phalangeal joint osteoarthritis.  He underwent injection on the left side about 7 weeks ago in December 2023.  He states he is got great symptomatic relief of pain and swelling.  Function is greatly improved.  He feels that things on the right have also improved.  He has some stiffness through flexion of the right long finger at the MCP but other than this he feels functionally  strength is improving and he is doing well overall.       Review of Systems:   GENERAL: Negative  GI: Negative  MUSCULOSKELETAL: See HPI  SKIN: Negative  NEURO:  Negative     Physical Exam:  GENERAL:  Alert and oriented to person, place, and time.  No acute distress and breathing comfortably; pleasant and cooperative with the examination.  HEENT:  Head is normocephalic and atraumatic.  NECK:  Supple, no visible swelling.  CARDIOVASCULAR:  No palpable tachycardia.  LUNGS:  No audible wheezing or labored breathing.  ABDOMEN:  Nondistended.  Extremities: Evaluation of bilateral upper extremities finds the patient to have a palpable radial artery at the wrist with brisk capillary refill to all digits. The patient has intact sensorium to axillary, radial, median and ulnar nerves. There are no open wounds. There are no signs of infection. There is no evidence of lymphedema or lymphatic streaking. The patient has supple compartments of the bilateral arms, forearms and hands.  Patient has no tenderness palpation over the bilateral third metacarpal phalangeal joints on exam today.  No extensor lag.       Imaging:  None today.     Assessment:  Bilateral third metacarpal phalangeal  joint osteoarthritis.     Plan:  Operative and nonoperative treatment strategies reviewed.  Patient elects for continued nonoperative management of simple observation for now.  We did discuss possibility of repeat injection in the future if his symptoms return, he will follow-up with our office as needed basis but we do anticipate return in the future if symptoms dictate.  All questions answered today's visit.    In a face to face encounter, I performed a history and physical examination, discussed pertinent diagnostic studies if indicated, and discussed diagnosis and management strategies with both the patient and the mid-level provider. I reviewed the mid-level's note and agree with the documented findings and plan of care.      Patient presents today for evaluation of bilateral third MCP osteoarthritis.  Patient is status post steroid injection to left third MCP.  The injection worked great.  The left side is not symptomatic at all and the right side is manageable.  We talked about ongoing treatment strategies and recommended for simple observation and follow-up when pain returns.  Patient is agreeable with that strategy.

## 2024-03-04 ENCOUNTER — OFFICE VISIT (OUTPATIENT)
Dept: OTOLARYNGOLOGY | Facility: CLINIC | Age: 69
End: 2024-03-04
Payer: MEDICARE

## 2024-03-04 VITALS — HEIGHT: 71 IN | BODY MASS INDEX: 26.68 KG/M2 | WEIGHT: 190.6 LBS

## 2024-03-04 DIAGNOSIS — E04.1 THYROID NODULE: Primary | ICD-10-CM

## 2024-03-04 DIAGNOSIS — J02.9 SORE THROAT: ICD-10-CM

## 2024-03-04 PROCEDURE — 99213 OFFICE O/P EST LOW 20 MIN: CPT | Performed by: OTOLARYNGOLOGY

## 2024-03-04 PROCEDURE — 31575 DIAGNOSTIC LARYNGOSCOPY: CPT | Performed by: OTOLARYNGOLOGY

## 2024-03-04 PROCEDURE — 1126F AMNT PAIN NOTED NONE PRSNT: CPT | Performed by: OTOLARYNGOLOGY

## 2024-03-04 PROCEDURE — 1159F MED LIST DOCD IN RCRD: CPT | Performed by: OTOLARYNGOLOGY

## 2024-03-04 PROCEDURE — 1160F RVW MEDS BY RX/DR IN RCRD: CPT | Performed by: OTOLARYNGOLOGY

## 2024-03-04 PROCEDURE — 1036F TOBACCO NON-USER: CPT | Performed by: OTOLARYNGOLOGY

## 2024-03-04 NOTE — PROGRESS NOTES
ENT Follow up Visit    History Of Present Illness  Shaan Beth is a 68 y.o. male presents for follow up    Hx of neck/throat pain and a thyroid nodule. CT neck was ordered to assess submandibular gland. The CT scan was unremarkable other than an incidental finding of a thyroid nodule that measured ~ 2.5 cm. FNA was obtained and was benign. It remained stable on follow up ultrasounds. Last ultrasound 7/2023    Hx of EOE and has not had treatment for this     3/4/24: Patient returns for follow-up.  He reports an approximately 5-week history of left-sided throat discomfort.  It is gotten slightly better but has not resolved.  The only exacerbating factor he can remember is working around dust right before the episode started however his symptoms will usually resolve within a few days.  He does have a history of allergies.  Remote history of smoking.  He is getting established with a new GI physician next week  Past Medical History  He has a past medical history of Arthritis, Bradycardia, Hypotension, Numbness and tingling, Personal history of other diseases of male genital organs, Personal history of other diseases of the digestive system, Snores, and Thyroid nodule.    Surgical History  He has a past surgical history that includes Foot surgery (Left); MR angio neck wo IV contrast (10/26/2022); Tonsillectomy; Knee surgery (Right); Shoulder surgery (Right); LASIK (Bilateral); IR biopsy neck thyroid; and Vasectomy.     Social History  He reports that he has quit smoking. His smoking use included cigarettes. He has a 16.00 pack-year smoking history. He has never used smokeless tobacco. He reports current alcohol use. He reports that he does not use drugs.    Family History  Family History   Problem Relation Name Age of Onset    Hypertension Mother      Other (non-contributory) Mother      COPD Father      Other (non-contributory) Father          Allergies  Amoxicillin and Augmentin [amoxicillin-pot clavulanate]    "  Physical Exam:  CONSTITUTIONAL:  No acute distress  VOICE:  No hoarseness or other abnormality  RESPIRATION:  Breathing comfortably, no stridor  CV:  No clubbing/cyanosis/edema in hands  EYES:  EOM intact, sclera normal  NEURO:  Alert and oriented times 3, Cranial nerves II-XII grossly intact and symmetric bilaterally  HEAD AND FACE:  Symmetric facial features, no masses or lesions, sinuses non-tender to palpation  SALIVARY GLANDS:  Parotid and submandibular glands normal bilaterally  EARS:  Normal external ears, external auditory canals, and TMs to otoscopy, normal hearing to whispered voice.  NOSE:  External nose midline, anterior rhinoscopy is normal with limited visualization to the anterior aspect of the interior turbinates, no bleeding or drainage, no lesions  ORAL CAVITY/OROPHARYNX/LIPS:  Normal mucous membranes, normal floor of mouth/tongue/OP, no masses or lesions  PHARYNGEAL WALLS:  No masses or lesions  NECK/LYMPH:  No LAD, no thyroid masses, trachea midline  SKIN:  Neck skin is without scar or injury  PSYCH:  Alert and oriented with appropriate mood and affect    Procedure Note: Flexible Nasolaryngoscopy  Verbal informed consent was obtained from the patient/patient's guardian. 4% lidocaine mixed with phenylephrine was prepared and dripped into the nose. It was placed in the right naris. Following an appropriate amount of time to allow for adequate anesthesia, a flexible fiberoptic nasolaryngoscope was placed into the patient's right naris. The nasal cavity, nasopharynx, oropharynx, hypopharynx, and all endolaryngeal structures were visualized and were normal except as listed below. Significant findings included:  -True vocal cord mobile, no mucosal lesions     Last Recorded Vitals  Height 1.803 m (5' 11\"), weight 86.5 kg (190 lb 9.6 oz).       Assessment and Plan  68 y.o. male referred for evaluation of neck pain. CT neck unremarkable other than incidental finding of thyroid nodule that was benign on " FNA     -Thyroid nodule: stable. He is due for follow-up ultrasound of the thyroid nodule in July/Aug 2024  -Lymphadenopathy: No abnormal lymphadenopathy on palpation  -throat/neck symptoms: no concerns on scope exam. has hx of eoe and is not on treatment. Discussed this may be source of sore throat and advised him to follow up with GI  -follow up after ultrasound, earlier with issues    Kenan Short MD

## 2024-03-08 PROBLEM — M19.041 PRIMARY OSTEOARTHRITIS OF BOTH HANDS: Status: RESOLVED | Noted: 2023-11-13 | Resolved: 2024-03-08

## 2024-03-08 PROBLEM — M47.816 LUMBAR SPONDYLOSIS: Status: RESOLVED | Noted: 2024-01-30 | Resolved: 2024-03-08

## 2024-03-08 PROBLEM — M54.6 PAIN IN THORACIC SPINE: Status: RESOLVED | Noted: 2024-01-30 | Resolved: 2024-03-08

## 2024-03-08 PROBLEM — S61.219A LACERATION OF FINGER: Status: RESOLVED | Noted: 2022-05-05 | Resolved: 2024-03-08

## 2024-03-08 PROBLEM — S63.659A SPRAIN OF METACARPOPHALANGEAL JOINT: Status: RESOLVED | Noted: 2019-07-22 | Resolved: 2024-03-08

## 2024-03-08 PROBLEM — M54.2 NECK PAIN: Status: RESOLVED | Noted: 2022-07-13 | Resolved: 2024-03-08

## 2024-03-08 PROBLEM — M19.019 PRIMARY LOCALIZED OSTEOARTHROSIS OF SHOULDER REGION: Status: RESOLVED | Noted: 2018-05-22 | Resolved: 2024-03-08

## 2024-03-08 PROBLEM — M47.817 LUMBOSACRAL SPONDYLOSIS WITHOUT MYELOPATHY: Status: RESOLVED | Noted: 2018-01-15 | Resolved: 2024-03-08

## 2024-03-08 PROBLEM — M79.641 PAIN IN BOTH HANDS: Status: RESOLVED | Noted: 2024-03-08 | Resolved: 2024-03-08

## 2024-03-08 PROBLEM — K11.8 MASS OF PAROTID GLAND: Status: RESOLVED | Noted: 2023-10-07 | Resolved: 2024-03-08

## 2024-03-08 PROBLEM — S83.282A ACUTE LATERAL MENISCUS TEAR OF LEFT KNEE: Status: RESOLVED | Noted: 2023-10-09 | Resolved: 2024-03-08

## 2024-03-08 PROBLEM — H92.02 LEFT EAR PAIN: Status: RESOLVED | Noted: 2024-03-08 | Resolved: 2024-03-08

## 2024-03-08 PROBLEM — M25.569 KNEE PAIN: Status: RESOLVED | Noted: 2022-02-21 | Resolved: 2024-03-08

## 2024-03-08 PROBLEM — M79.642 PAIN IN BOTH HANDS: Status: RESOLVED | Noted: 2024-03-08 | Resolved: 2024-03-08

## 2024-03-08 PROBLEM — M19.042 PRIMARY OSTEOARTHRITIS OF BOTH HANDS: Status: RESOLVED | Noted: 2023-11-13 | Resolved: 2024-03-08

## 2024-03-08 PROBLEM — M23.92 DERANGEMENT OF LEFT KNEE: Status: RESOLVED | Noted: 2024-03-08 | Resolved: 2024-03-08

## 2024-03-08 PROBLEM — J02.9 SORE THROAT: Status: RESOLVED | Noted: 2023-09-05 | Resolved: 2024-03-08

## 2024-03-08 PROBLEM — G89.18 POSTOPERATIVE PAIN: Status: RESOLVED | Noted: 2024-03-08 | Resolved: 2024-03-08

## 2024-03-18 NOTE — PROGRESS NOTES
REASON FOR VISIT:  trouble swallowing     HPI:  Shaan Beth is a 68 y.o. male who presents for a new patient visit     Pt states he was diagnosed with EOE about 10 years ago- used to take an inhaler - stopped this 5-6 years ago   States EGD's with dilations in the past  - both at the GE junction and mid esophagus  Reports dysphagia with regurgitation with solids every 3 days - he is unable to tolerate his saliva - last for up to 30 min - feels better once he regurgitates the bolus. He finds he avoids beef, bread - seems to occur more with grains, leafy.  NSAID use every two days    He denies GERD sx - no odynophagia, early satiety,   Weight and appetite stable      Social hx -non smoker, occasional etoh , NSAID use  Fam hx - no CRC, no IBD , no celiac        Med list notable for    Labs notable for    No fhx of CRC.       Prev endoscopic eval:     REVIEW OF SYSTEMS    Review of Systems   Constitutional: Negative for decreased appetite and weight loss.   Cardiovascular:  Negative for chest pain.   Respiratory:  Negative for cough and shortness of breath.    Gastrointestinal:  Positive for dysphagia. Negative for bloating, abdominal pain, diarrhea, excessive appetite, flatus, heartburn, hematemesis, hematochezia, hemorrhoids, jaundice, melena, nausea and vomiting.          Allergies   Allergen Reactions    Amoxicillin GI Upset    Augmentin [Amoxicillin-Pot Clavulanate] GI Upset       Past Medical History:   Diagnosis Date    Acute lateral meniscus tear of left knee 10/09/2023    Acute left-sided low back pain without sciatica 08/26/2015    Last Assessment & Plan: Formatting of this note might be different from the original.  Symptoms much improved with the physical therapy    Arthritis     Bradycardia     Degeneration of lumbar intervertebral disc 08/26/2015    Derangement of left knee 03/08/2024    Hypotension     Knee pain 02/21/2022    Laceration of finger 05/05/2022    Left ear pain 03/08/2024    Lumbar  spondylosis 01/30/2024    Lumbosacral spondylosis without myelopathy 01/15/2018    Mass of parotid gland 10/07/2023    Neck pain 07/13/2022    Numbness and tingling     Pain in both hands 03/08/2024    Pain in thoracic spine 01/30/2024    Personal history of other diseases of male genital organs     History of prostate disorder    Personal history of other diseases of the digestive system     History of gastroesophageal reflux (GERD)    Postoperative pain 03/08/2024    Primary localized osteoarthrosis of shoulder region 05/22/2018    Primary osteoarthritis of both hands 11/13/2023    Last Assessment & Plan: Formatting of this note might be different from the original.  Symptoms better after the steroid injection from the orthopedic surgeon    Demond     Sore throat 09/05/2023    Last Assessment & Plan: Formatting of this note might be different from the original.  Likely viral infection. Pt will take OTC meds symptomatically and let us know if sx worsen, change, or fail to improve in the next 3-5 days    Sprain of metacarpophalangeal joint 07/22/2019    Thyroid nodule        Past Surgical History:   Procedure Laterality Date    FOOT SURGERY Left     toe spur    IR BIOPSY NECK THYROID      KNEE SURGERY Right     arthroscopy    LASIK Bilateral     MR NECK ANGIO WO IV CONTRAST  10/26/2022    MR NECK ANGIO WO IV CONTRAST 10/26/2022 [facility]    SHOULDER SURGERY Right     arthroscopy    TONSILLECTOMY      VASECTOMY         Family History   Problem Relation Name Age of Onset    Hypertension Mother      Other (non-contributory) Mother      COPD Father      Other (non-contributory) Father         Social History     Tobacco Use    Smoking status: Former     Packs/day: 1.00     Years: 16.00     Additional pack years: 0.00     Total pack years: 16.00     Types: Cigarettes    Smokeless tobacco: Never   Substance Use Topics    Alcohol use: Yes     Comment: socially       Current Outpatient Medications   Medication Sig  Dispense Refill    acetaminophen (Tylenol) 500 mg tablet Take 1 tablet (500 mg) by mouth every 6 hours if needed for mild pain (1 - 3).      ibuprofen 800 mg tablet TAKE 1 TABLET BY MOUTH EVERY 8 HOURS AS NEEDED WITH FOOD OR MILK      zolpidem CR (Ambien CR) 12.5 mg ER tablet Take 1 tablet (12.5 mg) by mouth as needed at bedtime for sleep. Do not crush, chew, or split.       No current facility-administered medications for this visit.       PHYSICAL EXAM:  There were no vitals taken for this visit.     Physical Exam  Constitutional:       Comments: Awake   HENT:      Head: Normocephalic.   Cardiovascular:      Rate and Rhythm: Normal rate and regular rhythm.   Pulmonary:      Effort: Pulmonary effort is normal.      Breath sounds: Normal breath sounds.   Abdominal:      General: Bowel sounds are normal.      Palpations: Abdomen is soft.   Neurological:      Mental Status: He is alert.   Psychiatric:         Mood and Affect: Mood normal.          ASSESSMENT  68-year-old male presents to establish care.  He was diagnosed with eosinophilic esophagitis approximately 10 years ago and took inhalers for some time.  He now reports episodes of dysphagia that are consistent with the food impaction resolved with regurgitation.  He has resolved his symptoms within 30 minutes and has not required any urgent endoscopies.  He does have a significant allergy to certain types of trees which he notices more dysphagia in certain times of the year.  He denies any other alarm symptoms.  He will be scheduled for an upper endoscopy for further evaluation with biopsies and consider Dupixent.  I will also obtain the previous endoscopy reports.  His last colonoscopy was in 2022 with Dr. Yanez.  I will see him in follow-up in 6 months    Evaluation requested by Dr. Hung Urban MD.   My final recommendations will be communicated back to the requesting physician by way of shared Medical record or letter to requesting physician via  fax.      Attending Note:   I have personally performed a face to face assessment of this Patient, which included an interview, physical exam and Assessment and Plan.  I have reviewed and confirmed the history and key findings as documented by the Medical Assistant and edited as appropriate.     Signature: Orquidea Garduno MD    Date: 3/18/2024  Time: 7:21 PM

## 2024-03-19 ENCOUNTER — OFFICE VISIT (OUTPATIENT)
Dept: GASTROENTEROLOGY | Facility: CLINIC | Age: 69
End: 2024-03-19
Payer: MEDICARE

## 2024-03-19 VITALS
DIASTOLIC BLOOD PRESSURE: 55 MMHG | RESPIRATION RATE: 18 BRPM | SYSTOLIC BLOOD PRESSURE: 97 MMHG | WEIGHT: 190 LBS | TEMPERATURE: 97.3 F | BODY MASS INDEX: 26.6 KG/M2 | HEIGHT: 71 IN | HEART RATE: 56 BPM | OXYGEN SATURATION: 97 %

## 2024-03-19 DIAGNOSIS — K20.0 EOSINOPHILIC ESOPHAGITIS: Primary | ICD-10-CM

## 2024-03-19 PROCEDURE — 1160F RVW MEDS BY RX/DR IN RCRD: CPT | Performed by: INTERNAL MEDICINE

## 2024-03-19 PROCEDURE — 1036F TOBACCO NON-USER: CPT | Performed by: INTERNAL MEDICINE

## 2024-03-19 PROCEDURE — 99214 OFFICE O/P EST MOD 30 MIN: CPT | Performed by: INTERNAL MEDICINE

## 2024-03-19 PROCEDURE — 1159F MED LIST DOCD IN RCRD: CPT | Performed by: INTERNAL MEDICINE

## 2024-03-19 ASSESSMENT — ENCOUNTER SYMPTOMS
BLOATING: 0
WEIGHT LOSS: 0
JAUNDICE: 0
HEARTBURN: 0
HEMATOCHEZIA: 0
DIARRHEA: 0
COUGH: 0
SHORTNESS OF BREATH: 0
FLATUS: 0
NAUSEA: 0
VOMITING: 0
EXCESSIVE APPETITE: 0
HEMATEMESIS: 0
ABDOMINAL PAIN: 0
DECREASED APPETITE: 0

## 2024-04-05 PROBLEM — S83.282A OTHER TEAR OF LATERAL MENISCUS, CURRENT INJURY, LEFT KNEE, INITIAL ENCOUNTER: Status: RESOLVED | Noted: 2023-10-09 | Resolved: 2024-04-05

## 2024-04-05 PROBLEM — S83.242A TEAR OF MEDIAL MENISCUS OF LEFT KNEE, CURRENT: Status: RESOLVED | Noted: 2023-10-09 | Resolved: 2024-04-05

## 2024-04-15 ENCOUNTER — ANESTHESIA EVENT (OUTPATIENT)
Dept: GASTROENTEROLOGY | Facility: EXTERNAL LOCATION | Age: 69
End: 2024-04-15

## 2024-04-24 ENCOUNTER — HOSPITAL ENCOUNTER (OUTPATIENT)
Dept: GASTROENTEROLOGY | Facility: EXTERNAL LOCATION | Age: 69
Discharge: HOME | End: 2024-04-24
Payer: MEDICARE

## 2024-04-24 ENCOUNTER — ANESTHESIA (OUTPATIENT)
Dept: GASTROENTEROLOGY | Facility: EXTERNAL LOCATION | Age: 69
End: 2024-04-24

## 2024-04-24 VITALS
HEART RATE: 55 BPM | DIASTOLIC BLOOD PRESSURE: 66 MMHG | BODY MASS INDEX: 25.9 KG/M2 | OXYGEN SATURATION: 94 % | RESPIRATION RATE: 13 BRPM | SYSTOLIC BLOOD PRESSURE: 101 MMHG | WEIGHT: 185 LBS | HEIGHT: 71 IN | TEMPERATURE: 97.9 F

## 2024-04-24 DIAGNOSIS — K20.0 EOSINOPHILIC ESOPHAGITIS: ICD-10-CM

## 2024-04-24 PROCEDURE — 88305 TISSUE EXAM BY PATHOLOGIST: CPT

## 2024-04-24 PROCEDURE — 88305 TISSUE EXAM BY PATHOLOGIST: CPT | Performed by: PATHOLOGY

## 2024-04-24 PROCEDURE — 43239 EGD BIOPSY SINGLE/MULTIPLE: CPT | Performed by: INTERNAL MEDICINE

## 2024-04-24 PROCEDURE — 0753T DGTZ GLS MCRSCP SLD LEVEL IV: CPT

## 2024-04-24 RX ORDER — LIDOCAINE HYDROCHLORIDE 20 MG/ML
INJECTION, SOLUTION INFILTRATION; PERINEURAL AS NEEDED
Status: DISCONTINUED | OUTPATIENT
Start: 2024-04-24 | End: 2024-04-24

## 2024-04-24 RX ORDER — PROPOFOL 10 MG/ML
INJECTION, EMULSION INTRAVENOUS AS NEEDED
Status: DISCONTINUED | OUTPATIENT
Start: 2024-04-24 | End: 2024-04-24

## 2024-04-24 RX ORDER — ONDANSETRON HYDROCHLORIDE 2 MG/ML
4 INJECTION, SOLUTION INTRAVENOUS ONCE AS NEEDED
Status: DISCONTINUED | OUTPATIENT
Start: 2024-04-24 | End: 2024-04-25 | Stop reason: HOSPADM

## 2024-04-24 RX ORDER — SODIUM CHLORIDE 9 MG/ML
20 INJECTION, SOLUTION INTRAVENOUS CONTINUOUS
Status: DISCONTINUED | OUTPATIENT
Start: 2024-04-24 | End: 2024-04-25 | Stop reason: HOSPADM

## 2024-04-24 RX ADMIN — LIDOCAINE HYDROCHLORIDE 100 ML: 20 INJECTION, SOLUTION INFILTRATION; PERINEURAL at 14:18

## 2024-04-24 RX ADMIN — SODIUM CHLORIDE: 9 INJECTION, SOLUTION INTRAVENOUS at 14:16

## 2024-04-24 RX ADMIN — PROPOFOL 100 MG: 10 INJECTION, EMULSION INTRAVENOUS at 14:18

## 2024-04-24 RX ADMIN — PROPOFOL 100 MG: 10 INJECTION, EMULSION INTRAVENOUS at 14:24

## 2024-04-24 SDOH — HEALTH STABILITY: MENTAL HEALTH: CURRENT SMOKER: 0

## 2024-04-24 ASSESSMENT — COLUMBIA-SUICIDE SEVERITY RATING SCALE - C-SSRS
6. HAVE YOU EVER DONE ANYTHING, STARTED TO DO ANYTHING, OR PREPARED TO DO ANYTHING TO END YOUR LIFE?: NO
2. HAVE YOU ACTUALLY HAD ANY THOUGHTS OF KILLING YOURSELF?: NO
1. IN THE PAST MONTH, HAVE YOU WISHED YOU WERE DEAD OR WISHED YOU COULD GO TO SLEEP AND NOT WAKE UP?: NO

## 2024-04-24 ASSESSMENT — PAIN - FUNCTIONAL ASSESSMENT
PAIN_FUNCTIONAL_ASSESSMENT: 0-10

## 2024-04-24 ASSESSMENT — PAIN SCALES - GENERAL
PAIN_LEVEL: 0
PAINLEVEL_OUTOF10: 0 - NO PAIN

## 2024-04-24 NOTE — DISCHARGE INSTRUCTIONS
Patient Instructions Post Endoscopy Procedure      The anesthetics, sedatives or narcotics which were given to you today will be acting in your body for the next 24 hours, so you might feel a little sleepy or groggy.  This feeling should slowly wear off. Carefully read and follow the instructions.     You received sedation today:  - Do not drive or operate any machinery or power tools of any kind.   - No alcoholic beverages today, not even beer or wine.  - Do not make any important decisions or sign any legal documents.  - No over the counter medications that contain alcohol or that may cause drowsiness.    While it is common to experience mild to moderate abdominal distention, gas, or belching after your procedure, if any of these symptoms occur following discharge from the GI Lab or within one week of having your procedure, call the [facility] to be advised whether a visit to your nearest Urgent Care or Emergency Department is indicated.  Take this paper with you if you go.   - If you develop an allergic reaction to the medications that were given during your procedure such as difficulty breathing, rash, hives, severe nausea, vomiting or lightheadedness.  - If you experience chest pain, shortness of breath, severe abdominal pain, fevers and chills.  -If you develop signs and symptoms of bleeding such as blood in your spit, if your stools turn black, tarry, or bloody  - If you have not urinated within 8 hours following your procedure.  - If your IV site becomes painful, red, inflamed, or looks infected.    If you received a biopsy/polypectomy the following instructions apply below:  __ Do not use non-steroidal medications or anti-coagulants for one week following your procedure. (Examples of these types of medications are: Advil, Arthrotec, Aleve, Coumadin, Ecotrin, Heparin, Ibuprofen, Indocin, Motrin, Naprosyn, Nuprin, Plavix, Vioxx, and Voltarin, or their generic forms.  This list is not  all-inclusive.  Check with your physician or pharmacist before resuming medications.)   __ Eat a soft diet today.  Avoid foods that are poorly digested for the next 24 hours.  These foods would include: nuts, beans, lettuce, red meats, and fried foods. Start with liquids and advance your diet as tolerated, gradually work up to eating solids.   __ You can restart your ASA tomorrow  __ You can resume your anticoagulation therapy -     Your physician recommends the additional following instructions:    -You have a contact number available for emergencies. The signs and symptoms of potential delayed complications were discussed with you. You may return to normal activities tomorrow.  -Resume your previous diet or other if specified.  -Continue your present medications.   -We are waiting for your pathology results, if applicable. The results will be available in [facility]. I will send you a message with any recommendations.  -The findings and recommendations have been discussed with you and/or family.  -Please see Medication Reconciliation Form for new medication/medications prescribed.     If you experience any problems or have any questions following discharge from the GI Lab, please call: [phone] from 7 am- 4:30 pm.  In the event of an emergency please go to the closest Emergency Department or call Dr. Garduno at [phone]

## 2024-04-24 NOTE — ANESTHESIA POSTPROCEDURE EVALUATION
Patient: Shaan Beth    Procedure Summary       Date: 04/24/24 Room / Location: [facility]    Anesthesia Start: 1416 Anesthesia Stop:     Procedure: EGD Diagnosis: Eosinophilic esophagitis    Scheduled Providers: Orquidea SANTANA MD Responsible Provider: JENNYFER Bell    Anesthesia Type: MAC ASA Status: 2            Anesthesia Type: MAC    Vitals Value Taken Time   BP 96/56 04/24/24 1429   Temp 36.6 °C (97.9 °F) 04/24/24 1429   Pulse 61 04/24/24 1429   Resp 11 04/24/24 1429   SpO2 96 % 04/24/24 1429       Anesthesia Post Evaluation    Patient location during evaluation: bedside  Patient participation: complete - patient cannot participate  Level of consciousness: responsive to verbal stimuli and sedated  Pain score: 0  Pain management: adequate  Airway patency: patent  Cardiovascular status: acceptable and hemodynamically stable  Respiratory status: acceptable  Hydration status: acceptable  Postoperative Nausea and Vomiting: none        No notable events documented.

## 2024-04-24 NOTE — H&P
Outpatient Hospital Procedure    Patient Profile-Procedures  Initial Info  Patient Demographics  Name Shaan Beth  Date of Birth 1955  MRN [id number]  Address   [id number] [place] [id number] [facility] [address]    Primary Phone Number [phone]  Secondary Phone Number    Hung Russell    Procedures   EGD      Indication:  EoE - occ dysphagia    Primary contact name and number   Extended Emergency Contact Information  Primary Emergency Contact: Mayuri Beth  Address: [address] [place] [id number] [facility]  Home Phone: [phone]  Mobile Phone: [phone]  Relation: Spouse    General Health  Weight   Vitals:    04/24/24 1354   Weight: 83.9 kg (185 lb)     BMI Body mass index is 25.8 kg/m².    Allergies  Allergies   Allergen Reactions    Amoxicillin GI Upset    Augmentin [Amoxicillin-Pot Clavulanate] GI Upset       Past Medical History   Past Medical History:   Diagnosis Date    Acute lateral meniscus tear of left knee 10/09/2023    Acute left-sided low back pain without sciatica 08/26/2015    Last Assessment & Plan: Formatting of this note might be different from the original.  Symptoms much improved with the physical therapy    Arthritis     Bradycardia     Degeneration of lumbar intervertebral disc 08/26/2015    Derangement of left knee 03/08/2024    Hypotension     Knee pain 02/21/2022    Laceration of finger 05/05/2022    Left ear pain 03/08/2024    Lumbar spondylosis 01/30/2024    Lumbosacral spondylosis without myelopathy 01/15/2018    Mass of parotid gland 10/07/2023    Neck pain 07/13/2022    Numbness and tingling     Pain in both hands 03/08/2024    Pain in thoracic spine 01/30/2024    Personal history of other diseases of male genital organs     History of prostate disorder    Personal history of other diseases of the digestive system     History of gastroesophageal reflux (GERD)    Postoperative pain 03/08/2024    Primary  localized osteoarthrosis of shoulder region 05/22/2018    Primary osteoarthritis of both hands 11/13/2023    Last Assessment & Plan: Formatting of this note might be different from the original.  Symptoms better after the steroid injection from the orthopedic surgeon    Snores     Sore throat 09/05/2023    Last Assessment & Plan: Formatting of this note might be different from the original.  Likely viral infection. Pt will take OTC meds symptomatically and let us know if sx worsen, change, or fail to improve in the next 3-5 days    Sprain of metacarpophalangeal joint 07/22/2019    Thyroid nodule        Provider assessment  Diagnosis  Medication Reviewed - yes  Prior to Admission medications    Medication Sig Start Date End Date Taking? Authorizing Provider   acetaminophen (Tylenol) 500 mg tablet Take 1 tablet (500 mg) by mouth every 6 hours if needed for mild pain (1 - 3).   Yes Historical Provider, MD   ibuprofen 800 mg tablet TAKE 1 TABLET BY MOUTH EVERY 8 HOURS AS NEEDED WITH FOOD OR MILK 9/6/23  Yes Historical Provider, MD   zolpidem CR (Ambien CR) 12.5 mg ER tablet Take 6 mg by mouth as needed at bedtime for sleep. Do not crush, chew, or split.   Yes Historical Provider, MD       This is my H&P    Physical Exam  Physical Exam  Constitutional:       Comments: Awake   HENT:      Head: Normocephalic.   Cardiovascular:      Rate and Rhythm: Normal rate and regular rhythm.   Pulmonary:      Effort: Pulmonary effort is normal.      Breath sounds: Normal breath sounds.   Abdominal:      General: Bowel sounds are normal.      Palpations: Abdomen is soft.   Neurological:      Mental Status: He is alert.   Psychiatric:         Mood and Affect: Mood normal.           Oropharyngeal Classification II (hard and soft palate, upper portion of tonsils anduvula visible)  ASA PS Classification 2  Sedation Plan Deep  Procedure Plan - pre-procedural (re)assesment completed by physician:  discharge/transfer patient when discharge  criteria met    Orquidea Garduno MD  4/24/2024 2:16 PM

## 2024-04-24 NOTE — ANESTHESIA PREPROCEDURE EVALUATION
Patient: Shaan Beth    Procedure Information       Date/Time: 04/24/24 1430    Scheduled providers: Orquidea SANTANA MD    Procedure: EGD    Location: [facility]            Relevant Problems   Cardiac   (+) Mixed hyperlipidemia      Neuro   (+) Neuritis of foot      /Renal   (+) Benign prostatic hyperplasia without lower urinary tract symptoms       Clinical information reviewed:   Tobacco  Allergies  Meds   Med Hx  Surg Hx   Fam Hx  Soc Hx        NPO Detail:  NPO/Void Status  Date of Last Liquid: 04/24/24  Time of Last Liquid: 0730  Date of Last Solid: 04/23/24  Time of Last Solid: 2345  Last Intake Type: Clear fluids         Physical Exam    Airway  Mallampati: II  TM distance: >3 FB  Neck ROM: full     Cardiovascular - normal exam     Dental - normal exam     Pulmonary - normal exam  Breath sounds clear to auscultation     Abdominal            Anesthesia Plan    History of general anesthesia?: yes  History of complications of general anesthesia?: no    ASA 2     MAC     The patient is not a current smoker.    intravenous induction   Anesthetic plan and risks discussed with patient.    Plan discussed with CRNA.

## 2024-05-02 LAB
LABORATORY COMMENT REPORT: NORMAL
PATH REPORT.COMMENTS IMP SPEC: NORMAL
PATH REPORT.FINAL DX SPEC: NORMAL
PATH REPORT.GROSS SPEC: NORMAL
PATH REPORT.RELEVANT HX SPEC: NORMAL
PATH REPORT.TOTAL CANCER: NORMAL

## 2024-05-03 ENCOUNTER — TELEPHONE (OUTPATIENT)
Dept: GASTROENTEROLOGY | Facility: CLINIC | Age: 69
End: 2024-05-03
Payer: COMMERCIAL

## 2024-05-03 DIAGNOSIS — K20.0 EOSINOPHILIC ESOPHAGITIS: ICD-10-CM

## 2024-05-03 NOTE — TELEPHONE ENCOUNTER
PA request sent to [facility] via [facility] meds for "[employer]

## 2024-05-08 ENCOUNTER — SPECIALTY PHARMACY (OUTPATIENT)
Dept: PHARMACY | Facility: CLINIC | Age: 69
End: 2024-05-08

## 2024-05-10 ENCOUNTER — TELEPHONE (OUTPATIENT)
Dept: GASTROENTEROLOGY | Facility: CLINIC | Age: 69
End: 2024-05-10
Payer: COMMERCIAL

## 2024-05-10 ENCOUNTER — SPECIALTY PHARMACY (OUTPATIENT)
Dept: PHARMACY | Facility: CLINIC | Age: 69
End: 2024-05-10

## 2024-05-10 NOTE — TELEPHONE ENCOUNTER
Pt Dupixent has high out of pocket cost (around $2,000).  spec Pharm is working on patient assistance for patient. I lvm letting patient know this along with pharm number he can call for updates.

## 2024-06-03 ENCOUNTER — SPECIALTY PHARMACY (OUTPATIENT)
Dept: PHARMACY | Facility: CLINIC | Age: 69
End: 2024-06-03

## 2024-06-25 ENCOUNTER — SPECIALTY PHARMACY (OUTPATIENT)
Dept: PHARMACY | Facility: CLINIC | Age: 69
End: 2024-06-25

## 2024-07-01 ENCOUNTER — HOSPITAL ENCOUNTER (OUTPATIENT)
Dept: RADIOLOGY | Facility: CLINIC | Age: 69
Discharge: HOME | End: 2024-07-01
Payer: MEDICARE

## 2024-07-01 DIAGNOSIS — E04.1 THYROID NODULE: ICD-10-CM

## 2024-07-01 PROCEDURE — 76536 US EXAM OF HEAD AND NECK: CPT | Performed by: RADIOLOGY

## 2024-07-01 PROCEDURE — 76536 US EXAM OF HEAD AND NECK: CPT

## 2024-07-11 ENCOUNTER — SPECIALTY PHARMACY (OUTPATIENT)
Dept: PHARMACY | Facility: CLINIC | Age: 69
End: 2024-07-11

## 2024-07-31 ENCOUNTER — SPECIALTY PHARMACY (OUTPATIENT)
Dept: PHARMACY | Facility: CLINIC | Age: 69
End: 2024-07-31

## 2024-08-07 ENCOUNTER — TELEPHONE (OUTPATIENT)
Dept: GASTROENTEROLOGY | Facility: CLINIC | Age: 69
End: 2024-08-07
Payer: MEDICARE

## 2024-08-07 NOTE — TELEPHONE ENCOUNTER
Pt has not started Dupixent due to high out of pocket cost.  specialty pharmacy has sent him forms to fill out to apply for financial assistance. Upon follow up via  specialty - patient decided not to per roly application. Pt not currently on treatment for his EOE. I explained to him that it is important to begin treatment due to potential risks without it. I am reaching out to the pharmacy to see what financial assistance forms were sent . I can also have him fill out the dupixent [employer] application . Pt agreeable to work on forms. However , he has a cold right now and states it will have to wait until he feels better. I will follow up with him via [facility] per his request.

## 2024-08-08 ENCOUNTER — SPECIALTY PHARMACY (OUTPATIENT)
Dept: PHARMACY | Facility: CLINIC | Age: 69
End: 2024-08-08

## 2024-08-12 ENCOUNTER — APPOINTMENT (OUTPATIENT)
Dept: OTOLARYNGOLOGY | Facility: CLINIC | Age: 69
End: 2024-08-12
Payer: MEDICARE

## 2024-08-16 ENCOUNTER — APPOINTMENT (OUTPATIENT)
Dept: OTOLARYNGOLOGY | Facility: CLINIC | Age: 69
End: 2024-08-16
Payer: MEDICARE

## 2024-08-16 VITALS — WEIGHT: 182.4 LBS | HEIGHT: 71 IN | BODY MASS INDEX: 25.54 KG/M2

## 2024-08-16 DIAGNOSIS — E04.1 THYROID NODULE: Primary | ICD-10-CM

## 2024-08-16 PROCEDURE — 1036F TOBACCO NON-USER: CPT | Performed by: OTOLARYNGOLOGY

## 2024-08-16 PROCEDURE — 3008F BODY MASS INDEX DOCD: CPT | Performed by: OTOLARYNGOLOGY

## 2024-08-16 PROCEDURE — 99213 OFFICE O/P EST LOW 20 MIN: CPT | Performed by: OTOLARYNGOLOGY

## 2024-08-16 PROCEDURE — 1159F MED LIST DOCD IN RCRD: CPT | Performed by: OTOLARYNGOLOGY

## 2024-08-16 PROCEDURE — 1160F RVW MEDS BY RX/DR IN RCRD: CPT | Performed by: OTOLARYNGOLOGY

## 2024-08-16 PROCEDURE — 1126F AMNT PAIN NOTED NONE PRSNT: CPT | Performed by: OTOLARYNGOLOGY

## 2024-08-16 RX ORDER — FLUTICASONE PROPIONATE 50 MCG
1 SPRAY, SUSPENSION (ML) NASAL DAILY
COMMUNITY

## 2024-08-16 ASSESSMENT — PATIENT HEALTH QUESTIONNAIRE - PHQ9
2. FEELING DOWN, DEPRESSED OR HOPELESS: NOT AT ALL
SUM OF ALL RESPONSES TO PHQ9 QUESTIONS 1 AND 2: 0
1. LITTLE INTEREST OR PLEASURE IN DOING THINGS: NOT AT ALL

## 2024-08-16 ASSESSMENT — PAIN SCALES - GENERAL: PAINLEVEL: 0-NO PAIN

## 2024-08-16 NOTE — PROGRESS NOTES
ENT Follow up Visit    History Of Present Illness  Shaan Beth is a 68 y.o. male presents for follow up    Hx of neck/throat pain and a thyroid nodule. CT neck was ordered to assess submandibular gland. The CT scan was unremarkable other than an incidental finding of a thyroid nodule that measured ~ 2.5 cm. FNA was obtained and was benign. It remained stable on follow up ultrasounds. Last ultrasound 7/2023    Hx of EOE and has not had treatment for this     3/4/24: Patient returns for follow-up.  He reports an approximately 5-week history of left-sided throat discomfort.  It is gotten slightly better but has not resolved.  The only exacerbating factor he can remember is working around dust right before the episode started however his symptoms will usually resolve within a few days.  He does have a history of allergies.  Remote history of smoking.  He is getting established with a new GI physician next week    8/16/24: Patient returns for follow-up.  Had ultrasound in mid July that showed stable thyroid nodule.  He is overall doing well.  He recently had a cold and briefly had some muffled hearing but improved after receiving a Z-Elie  Past Medical History  He has a past medical history of Acute lateral meniscus tear of left knee (10/09/2023), Acute left-sided low back pain without sciatica (08/26/2015), Arthritis, Bradycardia, Degeneration of lumbar intervertebral disc (08/26/2015), Derangement of left knee (03/08/2024), Hypotension, Knee pain (02/21/2022), Laceration of finger (05/05/2022), Left ear pain (03/08/2024), Lumbar spondylosis (01/30/2024), Lumbosacral spondylosis without myelopathy (01/15/2018), Mass of parotid gland (10/07/2023), Neck pain (07/13/2022), Numbness and tingling, Pain in both hands (03/08/2024), Pain in thoracic spine (01/30/2024), Personal history of other diseases of male genital organs, Personal history of other diseases of the digestive system, Postoperative pain (03/08/2024), Primary  localized osteoarthrosis of shoulder region (05/22/2018), Primary osteoarthritis of both hands (11/13/2023), Snores, Sore throat (09/05/2023), Sprain of metacarpophalangeal joint (07/22/2019), and Thyroid nodule.    Surgical History  He has a past surgical history that includes Foot surgery (Left); MR angio neck wo IV contrast (10/26/2022); Tonsillectomy; Knee surgery (Right); Shoulder surgery (Right); LASIK (Bilateral); IR biopsy neck thyroid; and Vasectomy.     Social History  He reports that he has quit smoking. His smoking use included cigarettes. He has a 16 pack-year smoking history. He has never used smokeless tobacco. He reports current alcohol use. He reports that he does not use drugs.    Family History  Family History   Problem Relation Name Age of Onset    Hypertension Mother      Other (non-contributory) Mother      COPD Father      Other (non-contributory) Father          Allergies  Amoxicillin and Augmentin [amoxicillin-pot clavulanate]     Physical Exam:  CONSTITUTIONAL:  No acute distress  VOICE:  No hoarseness or other abnormality  RESPIRATION:  Breathing comfortably, no stridor  CV:  No clubbing/cyanosis/edema in hands  EYES:  EOM intact, sclera normal  NEURO:  Alert and oriented times 3, Cranial nerves II-XII grossly intact and symmetric bilaterally  HEAD AND FACE:  Symmetric facial features, no masses or lesions, sinuses non-tender to palpation  SALIVARY GLANDS:  Parotid and submandibular glands normal bilaterally  EARS:  Normal external ears, external auditory canals, and TMs to otoscopy, normal hearing to whispered voice.  NOSE:  External nose midline, anterior rhinoscopy is normal with limited visualization to the anterior aspect of the interior turbinates, no bleeding or drainage, no lesions  ORAL CAVITY/OROPHARYNX/LIPS:  Normal mucous membranes, normal floor of mouth/tongue/OP, no masses or lesions  PHARYNGEAL WALLS:  No masses or lesions  NECK/LYMPH:  No LAD, no thyroid masses, trachea  "midline  SKIN:  Neck skin is without scar or injury  PSYCH:  Alert and oriented with appropriate mood and affect         Last Recorded Vitals  Height 1.803 m (5' 11\"), weight 82.7 kg (182 lb 6.4 oz).       Assessment and Plan  68 y.o. male referred for evaluation of neck pain. CT neck unremarkable other than incidental finding of thyroid nodule that was benign on FNA     -Thyroid nodule: stable on surveillance ultrasounds.  He will get another ultrasound in 1 year.  Order was placed in the system  -Lymphadenopathy: No abnormal lymphadenopathy on palpation  -throat/neck symptoms: no concerns on scope exam. has hx of eoe and is not on treatment. Discussed this may be source of sore throat and advised him to follow up with GI  -follow up after ultrasound, earlier with issues    Kenan Short MD   "

## 2024-08-20 ENCOUNTER — TELEPHONE (OUTPATIENT)
Dept: GASTROENTEROLOGY | Facility: CLINIC | Age: 69
End: 2024-08-20
Payer: MEDICARE

## 2024-08-20 NOTE — TELEPHONE ENCOUNTER
Pt states he does not wish to have RX for budesonide slurry. He will fill out [employer] forms on his own time and let me know when he is ready

## 2024-09-17 ENCOUNTER — APPOINTMENT (OUTPATIENT)
Dept: GASTROENTEROLOGY | Facility: CLINIC | Age: 69
End: 2024-09-17
Payer: COMMERCIAL

## 2024-10-08 ENCOUNTER — TELEPHONE (OUTPATIENT)
Dept: GASTROENTEROLOGY | Facility: CLINIC | Age: 69
End: 2024-10-08
Payer: MEDICARE

## 2024-10-08 NOTE — TELEPHONE ENCOUNTER
Spoke with patient. He has not started Dupixent. He will call to schedule after he starts this therapy.

## 2025-03-07 ENCOUNTER — APPOINTMENT (OUTPATIENT)
Facility: CLINIC | Age: 70
End: 2025-03-07
Payer: COMMERCIAL

## 2025-04-08 ENCOUNTER — EVALUATION (OUTPATIENT)
Dept: PHYSICAL THERAPY | Facility: CLINIC | Age: 70
End: 2025-04-08
Payer: MEDICARE

## 2025-04-08 DIAGNOSIS — M54.50 LOW BACK PAIN WITHOUT SCIATICA, UNSPECIFIED BACK PAIN LATERALITY, UNSPECIFIED CHRONICITY: Primary | ICD-10-CM

## 2025-04-08 PROCEDURE — 97110 THERAPEUTIC EXERCISES: CPT | Mod: GP | Performed by: PHYSICAL THERAPIST

## 2025-04-08 PROCEDURE — 97161 PT EVAL LOW COMPLEX 20 MIN: CPT | Mod: GP | Performed by: PHYSICAL THERAPIST

## 2025-04-08 ASSESSMENT — PATIENT HEALTH QUESTIONNAIRE - PHQ9
SUM OF ALL RESPONSES TO PHQ9 QUESTIONS 1 AND 2: 0
1. LITTLE INTEREST OR PLEASURE IN DOING THINGS: NOT AT ALL
2. FEELING DOWN, DEPRESSED OR HOPELESS: NOT AT ALL

## 2025-04-08 ASSESSMENT — PAIN SCALES - GENERAL: PAINLEVEL_OUTOF10: 2

## 2025-04-08 ASSESSMENT — ENCOUNTER SYMPTOMS
OCCASIONAL FEELINGS OF UNSTEADINESS: 0
DEPRESSION: 0
LOSS OF SENSATION IN FEET: 0

## 2025-04-08 ASSESSMENT — PAIN - FUNCTIONAL ASSESSMENT: PAIN_FUNCTIONAL_ASSESSMENT: 0-10

## 2025-04-08 NOTE — PROGRESS NOTES
Physical Therapy  Visit Type: treatment  Precautions:  Medical precautions:  fall risk; standard precautions.  Pt is a 79-yr old female admitted to hospital on 5/2/21 with pneumonia, sepsis, COPD exacerbation.    At baseline, patient lives at [facility], she also has a caregiver 6 days per week for 8 hours each day to assist with meals and cares.  She is up with a walker at home and has a scooter for long distances.  Lines:     Basic: IV, O2 and urinary catheter      Lines in chart and on patient reviewed, cautions maintained throughout session.  Safety Measures: bed alarm and bed rails    SUBJECTIVE  Patient agreed to participate in therapy this date.  \"I want to walk in the albrecht today\"  Patient / Family Goal: return home and maximize function    Pain     At onset of session (out of 10): 0     OBJECTIVE      Transfers:    Assistive devices: 2-wheeled walker    Sit to stand: SBA.    Stand to sit: SBA.  Training completed:      Education details: body mechanics and patient safety    X 3 bouts with SBA for safety  Gait/Ambulation:     Assistance: contact guard/touching/steadying assist   Assistive device: 2-wheeled walker    Distance (ft): 150    Pattern: step through    Swing phase: Left: increased external rotation; Right: increased external rotation  Training Completed:      Education details: patient safety and body mechanics      Interventions     Seated    Lower Extremity: Bilateral: knee flexion, toe raises, heel raises and knee extensions, AROM, 10 reps, 1 sets  Training provided: gait training, transfer training, safety training and HEP training    Skilled input: Verbal instruction/cues       ASSESSMENT    Impairments: activity tolerance, endurance and strength  Functional Limitations: bed mobility, sit to/from stand transfers and ambulation  At baseline, pt resides at [facility] where she performs functional mobility with use of RW in apartment and motorized scooter outside of  Physical Therapy Evaluation and Treatment      Patient Name: Shaan Beth  MRN: [id number]  Today's Date: 4/8/2025  Visit #1  Time Calculation  Start Time: 1115  Stop Time: 1210  Time Calculation (min): 55 min    Insurance:  Visit Limit: Med Nec  Date Range: 7/8/2025  Co-Pay: None    Assessment:  Patient is presenting today low back pain. Examination findings are consistent with low back pain with movement coordination impairments and decreased muscular performance. Patient will benefit from physical therapy services to improve listed impairments. Initiated treatment today to address these impairments.    Patient with the following impairments: decreased muscle performance, decreased ROM, pain, and participation restrictions    Patient's response to session: No change in pain, Increased ROM/joint mobility, Increase motor control, and Increased knowledge and understanding    Plan:  Treatment/Interventions: Biofeedback, Dry needling, Education/ Instruction, Electrical stimulation, Gait training, Manual therapy, Neuromuscular re-education, Therapeutic exercises, Therapeutic activities, Self care/ home management, Vasopneumatic device  PT Plan: Skilled PT  PT Frequency: 1 time per week  Duration: 12 weeks  Onset Date: 02/07/25  Certification Period Start Date: 04/08/25  Certification Period End Date: 07/07/25  Rehab Potential: Good  Plan of Care Agreement: Patient    Current Problem:   1. Low back pain without sciatica, unspecified back pain laterality, unspecified chronicity            Subjective   Patient presenting today with low back pain. This started about three months ago while playing pickle ball. Pain is worse when getting out of bed first thing in the morning and while playing pickle ball. Pain is better with rest. Manages pain with OTC medication and icing. Currently, patient works out by playing pickle ball, walking, or riding his bike. Has experienced episodes of LBP in the past, but most resolved on their  apartment. Pt has caregiver assistance as well.   Ok for therapy per RN and pt received sitting up in bedside chair. Progressed gait training with RW from 20' to 150' with CGA. Pt educated re: HEP for BLE strengthening in sitting, incr OOB activity with RN staff to assist to bedside chair and ambulation mult times per day. Pt verbalizes understanding. Pt is not at baseline 2' to acute illness and will benefit from skilled acute PT while in the hospital to maximize indep. Outcomes d/w RN.     Discharge Recommendations   Recommendation for Discharge: PT IL: Patient needs nondaily skilled therapy after discharge, Patient requires intermittent nonskilled assistance to perform mobility and/or ADLs safely        PT/OT Mobility Equipment for Discharge: Pt owns needed equipment.             Skilled therapy is required to address these limitations in attempt to maximize the patient's independence.  Progress: progressing toward goals    End of Session:   Location: in chair  Safety measures: alarm system in place/re-engaged, call light within reach, equipment intact and lines intact  Handoff to: nurse    PLAN    Suggestions for next session as indicated: PT Frequency: 3 days/week      Interventions: balance, bed mobility, gait training, strengthening, safety education, functional transfer training and stairs retraining  Agreement to plan and goals: patient agrees with goals and treatment plan        GOALS  Review Date: 5/7/2021  Long Term Goals: (to be met by time of discharge from hospital)  Sit to supine: Patient will complete sit to supine independent.  Status: progressing/ongoing  Supine to sit: Patient will complete supine to sit independent.  Status: progressing/ongoing  Sit to stand: Patient will complete sit to stand transfer with 2-wheeled walker, modified independent.   Status: progressing/ongoing  Stand to sit: Patient will complete stand to sit transfer with 2-wheeled walker, modified independent.   Status:  own. Patient denies numbness/tingling, changes in bowel/bladder, saddle paresthesias, and falls. Patient wishes to return to playing pickle ball pain free.    Pain Assessment: 0-10  0-10 (Numeric) Pain Score: 2    General  Reason for Referral: Low back pain    Precautions  THOM Fall Risk Score (The score of 4 or more indicates an increased risk of falling): 0  Precautions Comment: none    Objective   Lumbar ROM  Flexion: No loss  Extension: No loss  Side bend (L/R): No loss/No loss  Rotation (L/R): No loss/No loss    Repeated Motions    Flexion: No change  Extension: No change  Side bend (L/R): No change, No change    Hip ROM (L/R)  Flexion: 110°/110°  Abduction: 45°/45°  Extension: 10°/10°  External Rotation: 45°/45°  Internal rotation: 45°/45°    Specific Lower Extremity MMT (L/R)  Iliopsoas: 4/5, 4/5 - pain in lower back with both sides  Gluteus Rick (prone): 4-/5, 4-/5  Gluteus Medius: 3+/5, 3+/5    DTR (L/R)  Patellar L4: 2+/2+  Achilles S1: 2+/2+    Dermatomes intact BLE    Joint mobility testing (normal unless otherwise noted below)  Hypomobile throughout CPA of T9-L5 and is experiencing pain throughout    TTP to right lumbar paraspinals    Outcome Measures:  Other Measures  Oswestry Disablity Index (HILARY): 9     Treatments:  Therapeutic Exercise (16105): 40 minutes  Bridge*  Dead bugs  TA activation  Supine TA activation with march*  Paloff Press, green TB*  Side steps, green TB*  HEP review    Manual Therapy (56262):  0 minutes  Not performed    Neuromuscular Re-education (69791):  0 minutes  Not performed    Education and discussion on HEP and treatment regarding the benefits related to current condition, POC, pathophysiology, and precautions    *added to HEP    Goals:  Patient will improve Modified Oswestry Low Back Pain Disability Index score to meet minimal detectable change of improvement to improve performance of ADLs.    Patient will be independent with home exercise program for proper  progressing/ongoing  Ambulation (even): Patient will ambulate on even surface for 150 feet with 2-wheeled walker, supervision.   Status: progressing/ongoing (distance increased from 25' to 150')    Documented in the chart in the following areas: Prior Level of Function. Assessment.      Therapy procedure time and total treatment time can be found documented on the Time Entry flowsheet   self-management of condition.    Patient will improve active range of motion in deficit areas for ADL completion.    Patient will improve strength in deficit areas so patient can work with less pain.    Patient will play pickle ball without pain.     Care provided under direct supervision of Blake Odonnell, PT, DPT, OCS, CSCS

## 2025-04-09 ENCOUNTER — APPOINTMENT (OUTPATIENT)
Facility: CLINIC | Age: 70
End: 2025-04-09
Payer: COMMERCIAL

## 2025-04-29 ENCOUNTER — TREATMENT (OUTPATIENT)
Dept: PHYSICAL THERAPY | Facility: CLINIC | Age: 70
End: 2025-04-29
Payer: MEDICARE

## 2025-04-29 DIAGNOSIS — M54.50 LOW BACK PAIN WITHOUT SCIATICA, UNSPECIFIED BACK PAIN LATERALITY, UNSPECIFIED CHRONICITY: Primary | ICD-10-CM

## 2025-04-29 PROCEDURE — 97110 THERAPEUTIC EXERCISES: CPT | Mod: GP | Performed by: PHYSICAL THERAPIST

## 2025-04-29 ASSESSMENT — PAIN SCALES - GENERAL: PAINLEVEL_OUTOF10: 1

## 2025-04-29 ASSESSMENT — PAIN - FUNCTIONAL ASSESSMENT: PAIN_FUNCTIONAL_ASSESSMENT: 0-10

## 2025-04-29 NOTE — PROGRESS NOTES
Physical Therapy Treatment      Patient Name: Shaan Beth  MRN: [id number]  Today's Date: 4/29/2025  Visit #2  Time Calculation  Start Time: 0845  Stop Time: 0925  Time Calculation (min): 40 min    Insurance:  Visit Limit: Med [facility]  Date Range: 7/8/2025  Co-Pay: None    Assessment:  Progressing strengthening exercises of core and BLE. Patient tolerating well. Updated HEP. Today's session focused on strength, ROM, neuromuscular control, and flexibility. Patient demonstrated good tolerance to session this date. They are demonstrating good progress in skilled rehabilitation at this time, though they are still limited by decreased muscle performance, pain, and participation restrictions. Patient continues to be a good candidate for skilled PT in order to further address listed impairments. Updated HEP to reflect today's session. All questions answered.     Patient's response to session: No change in pain, Increase motor control, and Increased knowledge and understanding    Plan:  Continue per POC.    Current Problem:   1. Low back pain without sciatica, unspecified back pain laterality, unspecified chronicity            Subjective   Patient reports doing much better since initial evaluation. Reports he feels stronger and back pain is decreasing. Still experiencing morning stiffness and mild pain with pickle ball. Completely HEP intermittently.    Pain Assessment: 0-10  0-10 (Numeric) Pain Score: 1    Precautions  Precautions Comment: none    Objective   Lumbar ROM  Flexion: No loss  Extension: No loss  Side bend (L/R): No loss/No loss  Rotation (L/R): No loss/No loss     Hip ROM (L/R)  Flexion: 110°/110°  Abduction: 45°/45°  Extension: 10°/10°  External Rotation: 45°/45°  Internal rotation: 45°/45°     Joint mobility testing (normal unless otherwise noted below)  Hypomobile throughout CPA of T9-L5 and is experiencing pain throughout     Back pain with bilateral SLR     Treatments:  Therapeutic Exercise (67848): 38  minutes  HEP review  Upright bike x5 mins  Bridge  Bridge with march*  Deadbugs in 90/90 without arms*  Band Series, red TB  Side steps*  Monster walk*  Band series  Seated PB lumbar flexion*  Pallof Press with lift, blue TB*  PB squat    Manual Therapy (00151):  0 minutes  Not performed    Neuromuscular Re-education (22111):  0 minutes  Not performed    Education and discussion on HEP and treatment regarding the benefits related to current condition, POC, pathophysiology, and precautions    *added to HEP     Care provided under direct supervision of Blake Odonnell, PT, DPT, OCS, CSCS

## 2025-05-03 ENCOUNTER — APPOINTMENT (OUTPATIENT)
Dept: RADIOLOGY | Facility: CLINIC | Age: 70
End: 2025-05-03
Payer: MEDICARE

## 2025-05-05 ENCOUNTER — APPOINTMENT (OUTPATIENT)
Dept: RADIOLOGY | Facility: CLINIC | Age: 70
End: 2025-05-05
Payer: MEDICARE

## 2025-05-05 DIAGNOSIS — J01.01 ACUTE RECURRENT MAXILLARY SINUSITIS: ICD-10-CM

## 2025-05-05 PROCEDURE — 70486 CT MAXILLOFACIAL W/O DYE: CPT | Performed by: RADIOLOGY

## 2025-05-05 PROCEDURE — 70486 CT MAXILLOFACIAL W/O DYE: CPT

## 2025-05-14 ENCOUNTER — APPOINTMENT (OUTPATIENT)
Facility: CLINIC | Age: 70
End: 2025-05-14
Payer: MEDICARE

## 2025-05-14 ENCOUNTER — OFFICE VISIT (OUTPATIENT)
Dept: PAIN MEDICINE | Facility: CLINIC | Age: 70
End: 2025-05-14
Payer: MEDICARE

## 2025-05-14 ENCOUNTER — HOSPITAL ENCOUNTER (OUTPATIENT)
Facility: HOSPITAL | Age: 70
Setting detail: OUTPATIENT SURGERY
End: 2025-05-14
Attending: PAIN MEDICINE | Admitting: PAIN MEDICINE
Payer: COMMERCIAL

## 2025-05-14 VITALS — WEIGHT: 186 LBS | HEIGHT: 71 IN | BODY MASS INDEX: 26.04 KG/M2

## 2025-05-14 DIAGNOSIS — E04.1 THYROID NODULE: Primary | ICD-10-CM

## 2025-05-14 DIAGNOSIS — J34.3 HYPERTROPHY OF NASAL TURBINATES: ICD-10-CM

## 2025-05-14 DIAGNOSIS — M54.51 VERTEBROGENIC LOW BACK PAIN: Primary | ICD-10-CM

## 2025-05-14 DIAGNOSIS — J34.2 DEVIATED NASAL SEPTUM: ICD-10-CM

## 2025-05-14 PROCEDURE — 99213 OFFICE O/P EST LOW 20 MIN: CPT | Performed by: OTOLARYNGOLOGY

## 2025-05-14 PROCEDURE — 1159F MED LIST DOCD IN RCRD: CPT | Performed by: PAIN MEDICINE

## 2025-05-14 PROCEDURE — 1125F AMNT PAIN NOTED PAIN PRSNT: CPT | Performed by: PAIN MEDICINE

## 2025-05-14 PROCEDURE — 99204 OFFICE O/P NEW MOD 45 MIN: CPT | Performed by: PAIN MEDICINE

## 2025-05-14 PROCEDURE — G2211 COMPLEX E/M VISIT ADD ON: HCPCS | Performed by: PAIN MEDICINE

## 2025-05-14 PROCEDURE — 1036F TOBACCO NON-USER: CPT | Performed by: OTOLARYNGOLOGY

## 2025-05-14 PROCEDURE — 99214 OFFICE O/P EST MOD 30 MIN: CPT | Performed by: PAIN MEDICINE

## 2025-05-14 PROCEDURE — 1159F MED LIST DOCD IN RCRD: CPT | Performed by: OTOLARYNGOLOGY

## 2025-05-14 PROCEDURE — 3008F BODY MASS INDEX DOCD: CPT | Performed by: OTOLARYNGOLOGY

## 2025-05-14 PROCEDURE — 1160F RVW MEDS BY RX/DR IN RCRD: CPT | Performed by: OTOLARYNGOLOGY

## 2025-05-14 PROCEDURE — 1126F AMNT PAIN NOTED NONE PRSNT: CPT | Performed by: OTOLARYNGOLOGY

## 2025-05-14 ASSESSMENT — PAIN SCALES - GENERAL
PAINLEVEL_OUTOF10: 3
PAINLEVEL_OUTOF10: 0-NO PAIN

## 2025-05-14 NOTE — PROGRESS NOTES
"Lower back pain  referred by Dr Borges  Pain is at its worse in the am   Does utilize ibuprofen ice and massage  Is in PT presently  Does recall getting a \"cortisone injection\"  about 6 months ago    Denies any weakness or numbness  "

## 2025-05-14 NOTE — PROGRESS NOTES
"ENT Follow up Visit    History Of Present Illness  Shaan Beth \"Sammy\" is a 69 y.o. male presents for follow up    Hx of neck/throat pain and a thyroid nodule. CT neck was ordered to assess submandibular gland. The CT scan was unremarkable other than an incidental finding of a thyroid nodule that measured ~ 2.5 cm. FNA was obtained and was benign. It remained stable on follow up ultrasounds. Last ultrasound 7/2023    Hx of EOE and has not had treatment for this     3/4/24: Patient returns for follow-up.  He reports an approximately 5-week history of left-sided throat discomfort.  It is gotten slightly better but has not resolved.  The only exacerbating factor he can remember is working around dust right before the episode started however his symptoms will usually resolve within a few days.  He does have a history of allergies.  Remote history of smoking.  He is getting established with a new GI physician next week    8/16/24: Patient returns for follow-up.  Had ultrasound in mid July that showed stable thyroid nodule.  He is overall doing well.  He recently had a cold and briefly had some muffled hearing but improved after receiving a Z-Elie    5/14/2025: Patient returns for follow-up after 2 episodes of sinusitis back-to-back.  After the last episode he received a course of Augmentin which helped resolve his symptoms.  He had a CT scan of his sinuses which was reviewed.  It does show a slight septal deviation on the left-hand side.  Past Medical History  He has a past medical history of Acute lateral meniscus tear of left knee (10/09/2023), Acute left-sided low back pain without sciatica (08/26/2015), Arthritis, Bradycardia, Degeneration of lumbar intervertebral disc (08/26/2015), Derangement of left knee (03/08/2024), Hypotension, Knee pain (02/21/2022), Laceration of finger (05/05/2022), Left ear pain (03/08/2024), Lumbar spondylosis (01/30/2024), Lumbosacral spondylosis without myelopathy (01/15/2018), Mass of " parotid gland (10/07/2023), Neck pain (07/13/2022), Numbness and tingling, Pain in both hands (03/08/2024), Pain in thoracic spine (01/30/2024), Personal history of other diseases of male genital organs, Personal history of other diseases of the digestive system, Postoperative pain (03/08/2024), Primary localized osteoarthrosis of shoulder region (05/22/2018), Primary osteoarthritis of both hands (11/13/2023), Snores, Sore throat (09/05/2023), Sprain of metacarpophalangeal joint (07/22/2019), and Thyroid nodule.    Surgical History  He has a past surgical history that includes Foot surgery (Left); MR angio neck wo IV contrast (10/26/2022); Tonsillectomy; Knee surgery (Right); Shoulder surgery (Right); LASIK (Bilateral); IR biopsy neck thyroid; and Vasectomy.     Social History  He reports that he has quit smoking. His smoking use included cigarettes. He has a 16 pack-year smoking history. He has never used smokeless tobacco. He reports current alcohol use. He reports that he does not use drugs.    Family History  Family History   Problem Relation Name Age of Onset    Hypertension Mother      Other (non-contributory) Mother      COPD Father      Other (non-contributory) Father          Allergies  Amoxicillin and Augmentin [amoxicillin-pot clavulanate]     Physical Exam:  CONSTITUTIONAL:  No acute distress  VOICE:  No hoarseness or other abnormality  RESPIRATION:  Breathing comfortably, no stridor  CV:  No clubbing/cyanosis/edema in hands  EYES:  EOM intact, sclera normal  NEURO:  Alert and oriented times 3  HEAD AND FACE:  Symmetric facial features, no masses or lesions, sinuses non-tender to palpation  SALIVARY GLANDS:  Parotid and submandibular glands normal bilaterally  EARS:  Normal external ears, external auditory canals, and TMs to otoscopy, normal hearing to whispered voice.  NOSE:  External nose midline, anterior rhinoscopy shows septal deviation on the left and moderate turbinate hypertrophy. No polyps or  "mass  ORAL CAVITY/OROPHARYNX/LIPS:  Normal mucous membranes, normal floor of mouth/tongue/OP, no masses or lesions  PHARYNGEAL WALLS:  No masses or lesions  NECK/LYMPH:  No palpable LAD, no thyroid masses, trachea midline  SKIN:  Neck skin is without scar or injury  PSYCH:  Alert and oriented with appropriate mood and affect         Last Recorded Vitals  Height 1.803 m (5' 11\"), weight 84.4 kg (186 lb).       Assessment and Plan  69 y.o. male referred for evaluation of neck pain. CT neck unremarkable other than incidental finding of thyroid nodule that was benign on FNA     -Thyroid nodule: stable on surveillance ultrasounds.  He will be due for next ultrasound in next few months, Order was previously placed in the system  -throat/neck symptoms: no concerns on scope exam. has hx of eoe and is not on treatment. Discussed this may be source of sore throat and advised him to follow up with GI if it becomes more bothersome  -Discussed sinonasal symptoms.  He does have septal deviation and moderate turbinate hypertrophy.  Would recommend daily use of Flonase before consideration of surgery.  - Advised him that I would be leaving  in July.  He will get established with new ENT at that time    Kenan Short MD   "

## 2025-05-14 NOTE — H&P
"History Of Present Illness  Shaan Beth \"Sammy\" is a 69 y.o. male presenting with   Lower back pain  referred by Dr Borges  Pain is at its worse in the am had back pain for years triggered by bending , activities such as playing pick[employer]coviatell .  Does utilize ibuprofen ice and massage with minimal improvement was tried also on muscle relaxant and oral steroid with minimal improvement, was also prescribed in the past short courses of tramadol and hydrocodone patient was worried about opiate dependency knowing that the trial of the medication did not give him any significant positive response  Is in PT presently with minimal improvement   Does recall getting a \"cortisone injection\"  about 6 months ago   Denies any weakness or numbness   Rating pain at best 2 and worse at 7 out of 10 ,bending lifting shoveling snow brings it to 9 .   Described mainly as stiffness above the waste line with no radiation down the legs .  Past Medical History  Medical History[1]  Surgical History  Surgical History[2]  Social History  He reports that he has quit smoking. His smoking use included cigarettes. He has a 16 pack-year smoking history. He has never used smokeless tobacco. He reports current alcohol use. He reports that he does not use drugs.    Family History  Family History[3]     Allergies  Allergies[4]  Review of Systems   12 Systems have been reviewed as follows.   Constitutional: Fever, weight gain, weight loss, appetite change, night sweats, fatigue, chills.  Eyes : blurry, double vision, vision, loss, tearing, redness, pain, sensitivity to light, glaucoma.  Ears, nose, mouth, and throat: Hearing loss, ringing in the ears, ear pain, nasal congestion, nasal drainage, nosebleeds, mouth, throat, irritation tooth problem.  Cardiovascular :chest pain, pressure, heart tracing,palpaitations , sweating, leg swelling, high or low blood pressure  Pulmonary: Cough, yellow or green sputum, blood and sputum, shortness of breath, " wheezing  Gastrointestinal: Nause, vomiting, diarrhea, constipation, pain, blood in stool, or vomitus, heartburn, difficulty swallowing  Genitourinary: incontinence, abnormal bleeding, abnormal discharge, urinary frequency, urinary hesitancy, pain, impotence sexual problem, infection, urinary retention  Musculoskeletal: Pain, stiffness, joint, redness or warmth, arthritis, back pain, weakness, muscle wasting, sprain or fracture  Neuro: Weight weakness, dizziness, change in voice, change in taste change in vision, change in hearing, loss, or change of sensation, trouble walking, balance problems coordination problems, shaking, speech problem  Endocrine , cold or heat intolerance, blood sugar problem, weight gain or loss missed periods hot flashes, sweats, change in body hair, change in libido, increased thirst, increased urination  Heme/lymph: Swelling, bleeding, problem anemia, bruising, enlarged lymph nodes  Allergic/immunologic: H. plus nasal drip, watery itchy eyes, nasal drainage, immunosuppressed  The above, were reviewed and noted negative except as noted.     Physical Exam   Vital signs reviewed, documented in chart     General:  Appears well, does not look in any major distress  Alert    HEENT:  Head atraumatic  Eyes normal inspection  PERRL  Normal ENT inspection  No signs of dehydration    NECK:  Normal inspection  Range of motion within normal     RESPIRATORY:  No respiratory distress    CVS:  Heart rate and rhythm regular    ABDOMEN/GI  Soft  Non-tender  No distention  No organomegaly      BACK:  Normal inspection, flexion and extension provocative of pain on pending   Some tenderness upon the palpation of the facet joint  Si joints none tender to palpations     EXTREMITIES:  Non-Tender  Full ROM  Normal appearance  No Pedal edema  Power symmetrical , sensory examination preserved.    NEURO:  Alert and oriented X 3  CNS normal as tested without focal neurological deficit   Sensation normal  Motor  normal  reflexes absent     PSYCH:  Mood normal  Affect normal    SKIN:  Color normal  No rash  Warm  Dry  no sign of skin marking supportive of IV drug usage /abuse.     Last Recorded Vitals  There were no vitals taken for this visit.  MR LUMBAR SPINE WO CONTRAST  Order: [id number]  Narrative    EXAM DESCRIPTION:  MR LUMBAR SPINE WITHOUT IV CONTRAST    CLINICAL INDICATION:  M54.50;PAIN    TECHNIQUE:  Multiple MRI sequences of the lumbar spine were obtained without intravenous contrast in the axial and sagittal orientations.    COMPARISON:  None    FINDINGS:    The verebral body heights are maintained.  The vertebral body alignment is normal.  Modic type II discogenic endplate signal change at L2-3.  There is disc desiccation with disc narrowing and osteophytosis throughout the the lumbar spine, severe at L2-L3 and mild throughout the rest of the lumbar spine. The conus terminates at the level of L1-L2.   Normal signal in the conus and cauda equina nerve roots.  The paravertebral soft tissues are normal. There are several bilateral peripelvic cysts.    EVALUATION OF THE INDIVIDUAL LEVELS:    L1-L2:  Diffuse annular bulge. No central stenosis. The foramen are patent.    L2-L3:  Diffuse annular bulge. Mild central stenosis. Mild right-sided foraminal stenosis.    L3-L4:  Diffuse annular bulge. Mild bilateral facet hypertrophy. No central stenosis. Mild bilateral foraminal stenosis.    L4-L5:  Diffuse annular bulge. Mild bilateral facet hypertrophy. Ligamentum flavum thickening. Mild central stenosis. Moderate bilateral foraminal stenosis.    L5-S1:  Diffuse annular bulge. Mild bilateral facet hypertrophy. No central stenosis. Severe left and moderate right foraminal stenosis.      IMPRESSION:  1.  Multilevel degenerative disc disease, severe at L2-L3.  2.  Mild central stenosis at L2-L3 and L4-L5.  3.  Multilevel foraminal stenosis, severe on the left side at L5-S1.    Electronically signed by:  Alton Holly MD   04/02/2025 05:21 PM EDT RP Workstation: [id number]  Technologist:  [email or web address],ED  Dictated By:   ALTON GUNN MD  Signed By:     ALTON GUNN MD    Signed Out:    04/02/25 17:21:01    XR LS SPINE W BEND MIN 6 VIEWS  Order: [id number]  Narrative    EXAM DESCRIPTION:  XR LUMBAR SPINE 6 OR MORE VIEWS INCLUDING BENDING    CLINICAL INDICATION:  Low back pain    COMPARISON:  None.    TECHNIQUE:  6 view radiograph of the lumbar spine was obtained.    FINDINGS:    The vertebral body alignment is normal.  The vertebral body heights are maintained.  Multilevel disc narrowing, osteophytosis, and sclerosis throughout the lumbar spine, severe at L2-3.  The osseous mineralization is normal. The paravertebral soft tissues are normal.    IMPRESSION:  Multilevel degenerative disc disease, severe at L2-L3.    Electronically signed by:  Alton Gunn MD  04/02/2025 05:21 PM EDT RP Workstation: [id number]  Technologist:    Dictated By:   ALTON GUNN MD  Signed By:     ALTON GUNN MD    Assessment/Plan   69 years old with history and physical examination supportive of Modic type II changes at the L2-L3 level tried and failed conservative management including steroid injections through [facility] and physical therapy and nonsteroidal anti-inflammatory    Plan  Discussed with the patient the different modalities available for the treatment of his condition I advised the patient that he is a candidate for the Intracept procedure to be performed at the level of the L2 and L3 under fluoroscopic guidance and we did discuss the benefits and the risk of the procedure and the patient would like to proceed for future consideration I would consider the patient also for medial nerve branch blocks targeting the facet joints at the level of the L2-L3 L3-L4 under fluoroscopic guidance bilaterally and if he described positive response we could do the radiofrequency ablation of the medial nerve branches at those levels patient is welcome to  follow-up with pain clinic on as-needed basis      The above clinical summary has been dictated with voice recognition software. It has not been proofread for grammatical errors, typographical mistakes, or other semantic inconsistencies.    Thank you for visiting our office today. It was our pleasure to take part in your healthcare.     Please do not hesitate to contact the pain clinic after your visit with any questions or concerns at  M-F 8-4 pm       Tayler Sinclair M.D.  Medical Director , Division of Pain Medicine [facility]   of Anesthesiology and Pain Medicine  [facility] [employer]     [place] [address] [address] [place] [address]     Office: [phone]  Fax: [phone]      Tayler Sinclair MD       [1]   Past Medical History:  Diagnosis Date    Acute lateral meniscus tear of left knee 10/09/2023    Acute left-sided low back pain without sciatica 08/26/2015    Last Assessment & Plan: Formatting of this note might be different from the original.  Symptoms much improved with the physical therapy    Arthritis     Bradycardia     Degeneration of lumbar intervertebral disc 08/26/2015    Derangement of left knee 03/08/2024    Hypotension     Knee pain 02/21/2022    Laceration of finger 05/05/2022    Left ear pain 03/08/2024    Lumbar spondylosis 01/30/2024    Lumbosacral spondylosis without myelopathy 01/15/2018    Mass of parotid gland 10/07/2023    Neck pain 07/13/2022    Numbness and tingling     Pain in both hands 03/08/2024    Pain in thoracic spine 01/30/2024    Personal history of other diseases of male genital organs     History of prostate disorder    Personal history of other diseases of the digestive system     History of gastroesophageal reflux (GERD)    Postoperative pain 03/08/2024    Primary localized  osteoarthrosis of shoulder region 05/22/2018    Primary osteoarthritis of both hands 11/13/2023    Last Assessment & Plan: Formatting of this note might be different from the original.  Symptoms better after the steroid injection from the orthopedic surgeon    Snores     Sore throat 09/05/2023    Last Assessment & Plan: Formatting of this note might be different from the original.  Likely viral infection. Pt will take OTC meds symptomatically and let us know if sx worsen, change, or fail to improve in the next 3-5 days    Sprain of metacarpophalangeal joint 07/22/2019    Thyroid nodule    [2]   Past Surgical History:  Procedure Laterality Date    FOOT SURGERY Left     toe spur    IR BIOPSY NECK THYROID      KNEE SURGERY Right     arthroscopy    LASIK Bilateral     MR NECK ANGIO WO IV CONTRAST  10/26/2022    MR NECK ANGIO WO IV CONTRAST 10/26/2022 [facility]    SHOULDER SURGERY Right     arthroscopy    TONSILLECTOMY      VASECTOMY     [3]   Family History  Problem Relation Name Age of Onset    Hypertension Mother      Other (non-contributory) Mother      COPD Father      Other (non-contributory) Father     [4]   Allergies  Allergen Reactions    Amoxicillin GI Upset    Augmentin [Amoxicillin-Pot Clavulanate] GI Upset

## 2025-05-21 ENCOUNTER — TREATMENT (OUTPATIENT)
Dept: PHYSICAL THERAPY | Facility: CLINIC | Age: 70
End: 2025-05-21
Payer: MEDICARE

## 2025-05-21 DIAGNOSIS — M54.50 LOW BACK PAIN WITHOUT SCIATICA, UNSPECIFIED BACK PAIN LATERALITY, UNSPECIFIED CHRONICITY: Primary | ICD-10-CM

## 2025-05-21 PROCEDURE — 97110 THERAPEUTIC EXERCISES: CPT | Mod: GP | Performed by: PHYSICAL THERAPIST

## 2025-05-21 ASSESSMENT — PAIN SCALES - GENERAL: PAINLEVEL_OUTOF10: 3

## 2025-05-21 ASSESSMENT — PAIN - FUNCTIONAL ASSESSMENT: PAIN_FUNCTIONAL_ASSESSMENT: 0-10

## 2025-05-21 NOTE — PROGRESS NOTES
"Physical Therapy Treatment      Patient Name: Shaan Beth \"Sammy\"  MRN: [id number]  Today's Date: 5/21/2025  Visit #3  Time Calculation  Start Time: 0839  Stop Time: 0918  Time Calculation (min): 39 min    Insurance:  Visit Limit: Med Nec  Date Range: 7/8/2025  Co-Pay: None    Assessment:  Reduced symptoms by end of session. Today's session focused on strength, ROM, neuromuscular control, and flexibility. Patient demonstrated good tolerance to session this date. They are demonstrating good progress in skilled rehabilitation at this time, though they are still limited by decreased muscle performance, pain, and participation restrictions. Patient continues to be a good candidate for skilled PT in order to further address listed impairments. Updated HEP to reflect today's session. All questions answered.     Patient's response to session: No change in pain, Increase motor control, and Increased knowledge and understanding    Plan:  Continue per POC.    Current Problem:   1. Low back pain without sciatica, unspecified back pain laterality, unspecified chronicity  Follow Up In Physical Therapy          Subjective   Patient reports he felt like he was doing better overall, though has had more pain this week. He is unsure why. Feels tight throughout the day. Has been working on HEP.    Pain Assessment: 0-10  0-10 (Numeric) Pain Score: 3    Precautions  Precautions Comment: None    Objective   Lumbar ROM  Flexion: No loss  Extension: No loss  Side bend (L/R): No loss/No loss  Rotation (L/R): No loss/No loss     Hip ROM (L/R)  Flexion: 110°/110°  Abduction: 45°/45°  Extension: 10°/10°  External Rotation: 45°/45°  Internal rotation: 45°/45°     Joint mobility testing (normal unless otherwise noted below)  Hypomobile throughout CPA of T9-L5 and is experiencing pain throughout     Back pain with bilateral SLR     Treatments:  Therapeutic Exercise (30447): 38 minutes  HEP review  Upright bike x 4 min  Bridge with ADD iso*  Seated " "PB lumbar flexion*  Step ups, 6\"  Suitcase carry  Figure 4 stretch*  LTR*    Manual Therapy (32177):  0 minutes  Not performed    Neuromuscular Re-education (38562):  0 minutes  Not performed    Education and discussion on HEP and treatment regarding the benefits related to current condition, POC, pathophysiology, and precautions    *added to HEP   "

## 2025-05-28 ENCOUNTER — TELEPHONE (OUTPATIENT)
Dept: HEMATOLOGY/ONCOLOGY | Facility: CLINIC | Age: 70
End: 2025-05-28
Payer: COMMERCIAL

## 2025-05-28 NOTE — TELEPHONE ENCOUNTER
Reason for Conversation  5.28.25 After several attempts to reach patient I was able to speak to him today. He declines scheduling an appt at this time and will reach out if intertested. Provided number     Background       Disposition   No disposition on file.

## 2025-06-11 ENCOUNTER — APPOINTMENT (OUTPATIENT)
Dept: PHYSICAL THERAPY | Facility: CLINIC | Age: 70
End: 2025-06-11
Payer: MEDICARE

## 2025-06-11 DIAGNOSIS — M54.50 LOW BACK PAIN WITHOUT SCIATICA, UNSPECIFIED BACK PAIN LATERALITY, UNSPECIFIED CHRONICITY: Primary | ICD-10-CM

## 2025-06-24 ENCOUNTER — APPOINTMENT (OUTPATIENT)
Dept: PHYSICAL THERAPY | Facility: CLINIC | Age: 70
End: 2025-06-24
Payer: MEDICARE

## 2025-06-24 DIAGNOSIS — M54.50 LOW BACK PAIN WITHOUT SCIATICA, UNSPECIFIED BACK PAIN LATERALITY, UNSPECIFIED CHRONICITY: Primary | ICD-10-CM

## 2025-06-27 ENCOUNTER — TELEPHONE (OUTPATIENT)
Dept: PAIN MEDICINE | Facility: CLINIC | Age: 70
End: 2025-06-27
Payer: MEDICARE

## 2025-06-27 NOTE — TELEPHONE ENCOUNTER
Patient called in and advised he no longer wants the surgery his back is doing much better no longer needs the surgery.

## 2025-07-25 ENCOUNTER — LAB (OUTPATIENT)
Dept: LAB | Facility: CLINIC | Age: 70
End: 2025-07-25
Payer: MEDICARE

## 2025-07-25 ENCOUNTER — OFFICE VISIT (OUTPATIENT)
Dept: HEMATOLOGY/ONCOLOGY | Facility: CLINIC | Age: 70
End: 2025-07-25
Payer: MEDICARE

## 2025-07-25 VITALS
SYSTOLIC BLOOD PRESSURE: 113 MMHG | HEART RATE: 45 BPM | DIASTOLIC BLOOD PRESSURE: 70 MMHG | TEMPERATURE: 96.8 F | OXYGEN SATURATION: 95 % | RESPIRATION RATE: 16 BRPM | WEIGHT: 182.98 LBS | BODY MASS INDEX: 25.52 KG/M2

## 2025-07-25 DIAGNOSIS — D47.4: ICD-10-CM

## 2025-07-25 DIAGNOSIS — K20.0 EOSINOPHILIC ESOPHAGITIS: ICD-10-CM

## 2025-07-25 DIAGNOSIS — J30.2 SEASONAL ALLERGIC RHINITIS, UNSPECIFIED TRIGGER: ICD-10-CM

## 2025-07-25 DIAGNOSIS — M54.51 VERTEBROGENIC LOW BACK PAIN: ICD-10-CM

## 2025-07-25 DIAGNOSIS — R89.8 ABNORMAL BONE MARROW EXAMINATION: ICD-10-CM

## 2025-07-25 DIAGNOSIS — R89.8 ABNORMAL BONE MARROW EXAMINATION: Primary | ICD-10-CM

## 2025-07-25 LAB
ALBUMIN SERPL BCP-MCNC: 4.1 G/DL (ref 3.4–5)
ALP SERPL-CCNC: 78 U/L (ref 33–136)
ALT SERPL W P-5'-P-CCNC: 17 U/L (ref 10–52)
ANION GAP SERPL CALC-SCNC: 10 MMOL/L (ref 10–20)
AST SERPL W P-5'-P-CCNC: 20 U/L (ref 9–39)
BASOPHILS # BLD AUTO: 0.03 X10*3/UL (ref 0–0.1)
BASOPHILS NFR BLD AUTO: 0.5 %
BILIRUB SERPL-MCNC: 0.6 MG/DL (ref 0–1.2)
BUN SERPL-MCNC: 21 MG/DL (ref 6–23)
CALCIUM SERPL-MCNC: 8.8 MG/DL (ref 8.6–10.3)
CHLORIDE SERPL-SCNC: 108 MMOL/L (ref 98–107)
CO2 SERPL-SCNC: 27 MMOL/L (ref 21–32)
CREAT SERPL-MCNC: 1.27 MG/DL (ref 0.5–1.3)
EGFRCR SERPLBLD CKD-EPI 2021: 61 ML/MIN/1.73M*2
EOSINOPHIL # BLD AUTO: 0.11 X10*3/UL (ref 0–0.7)
EOSINOPHIL NFR BLD AUTO: 1.7 %
ERYTHROCYTE [DISTWIDTH] IN BLOOD BY AUTOMATED COUNT: 12.5 % (ref 11.5–14.5)
GLUCOSE SERPL-MCNC: 83 MG/DL (ref 74–99)
HCT VFR BLD AUTO: 41.1 % (ref 41–52)
HGB BLD-MCNC: 13.7 G/DL (ref 13.5–17.5)
IMM GRANULOCYTES # BLD AUTO: 0 X10*3/UL (ref 0–0.7)
IMM GRANULOCYTES NFR BLD AUTO: 0 % (ref 0–0.9)
LYMPHOCYTES # BLD AUTO: 1.81 X10*3/UL (ref 1.2–4.8)
LYMPHOCYTES NFR BLD AUTO: 27.2 %
MCH RBC QN AUTO: 30.9 PG (ref 26–34)
MCHC RBC AUTO-ENTMCNC: 33.3 G/DL (ref 32–36)
MCV RBC AUTO: 93 FL (ref 80–100)
MONOCYTES # BLD AUTO: 0.35 X10*3/UL (ref 0.1–1)
MONOCYTES NFR BLD AUTO: 5.3 %
NEUTROPHILS # BLD AUTO: 4.35 X10*3/UL (ref 1.2–7.7)
NEUTROPHILS NFR BLD AUTO: 65.3 %
NRBC BLD-RTO: ABNORMAL /100{WBCS}
PLATELET # BLD AUTO: 164 X10*3/UL (ref 150–450)
POTASSIUM SERPL-SCNC: 4 MMOL/L (ref 3.5–5.3)
PROT SERPL-MCNC: 6.5 G/DL (ref 6.4–8.2)
RBC # BLD AUTO: 4.44 X10*6/UL (ref 4.5–5.9)
SODIUM SERPL-SCNC: 141 MMOL/L (ref 136–145)
WBC # BLD AUTO: 6.7 X10*3/UL (ref 4.4–11.3)

## 2025-07-25 PROCEDURE — 83521 IG LIGHT CHAINS FREE EACH: CPT

## 2025-07-25 PROCEDURE — 84155 ASSAY OF PROTEIN SERUM: CPT

## 2025-07-25 PROCEDURE — G0452 MOLECULAR PATHOLOGY INTERPR: HCPCS | Performed by: PATHOLOGY

## 2025-07-25 PROCEDURE — 99204 OFFICE O/P NEW MOD 45 MIN: CPT | Performed by: INTERNAL MEDICINE

## 2025-07-25 PROCEDURE — 36415 COLL VENOUS BLD VENIPUNCTURE: CPT

## 2025-07-25 PROCEDURE — 99214 OFFICE O/P EST MOD 30 MIN: CPT | Mod: 25 | Performed by: INTERNAL MEDICINE

## 2025-07-25 PROCEDURE — 1159F MED LIST DOCD IN RCRD: CPT | Performed by: INTERNAL MEDICINE

## 2025-07-25 PROCEDURE — 81450 HL NEO GSAP 5-50DNA/DNA&RNA: CPT

## 2025-07-25 PROCEDURE — 80053 COMPREHEN METABOLIC PANEL: CPT

## 2025-07-25 PROCEDURE — 84165 PROTEIN E-PHORESIS SERUM: CPT

## 2025-07-25 PROCEDURE — 85025 COMPLETE CBC W/AUTO DIFF WBC: CPT

## 2025-07-25 PROCEDURE — 1160F RVW MEDS BY RX/DR IN RCRD: CPT | Performed by: INTERNAL MEDICINE

## 2025-07-25 PROCEDURE — 82784 ASSAY IGA/IGD/IGG/IGM EACH: CPT

## 2025-07-25 RX ORDER — FLUTICASONE PROPIONATE 50 MCG
1 SPRAY, SUSPENSION (ML) NASAL DAILY
COMMUNITY

## 2025-07-25 NOTE — PATIENT INSTRUCTIONS
"I will ask radiology department to import over your March NOMS images so I can review    We will screen you for multiple myeloma and myeloproliferative disorders, both of which can be associated with \"heterogeneous marrow pattern\"  "

## 2025-07-26 LAB
IGA SERPL-MCNC: 268 MG/DL (ref 70–400)
IGG SERPL-MCNC: 1010 MG/DL (ref 700–1600)
IGM SERPL-MCNC: 46 MG/DL (ref 40–230)
PROT SERPL-MCNC: 6.5 G/DL (ref 6.4–8.2)

## 2025-07-27 LAB
KAPPA LC SERPL-MCNC: 1.96 MG/DL (ref 0.33–1.94)
KAPPA LC/LAMBDA SER: 1.28 {RATIO} (ref 0.26–1.65)
LAMBDA LC SERPL-MCNC: 1.53 MG/DL (ref 0.57–2.63)

## 2025-07-30 LAB
ALBUMIN: 4.1 G/DL (ref 3.4–5)
ALPHA 1 GLOBULIN: 0.2 G/DL (ref 0.2–0.6)
ALPHA 2 GLOBULIN: 0.5 G/DL (ref 0.4–1.1)
BETA GLOBULIN: 0.7 G/DL (ref 0.5–1.2)
GAMMA GLOBULIN: 1 G/DL (ref 0.5–1.4)
PATH REVIEW-SERUM PROTEIN ELECTROPHORESIS: NORMAL
PROTEIN ELECTROPHORESIS COMMENT: NORMAL

## 2025-08-01 LAB
ELECTRONICALLY SIGNED BY: NORMAL
MYELOID NGS RESULTS: NORMAL

## 2025-08-06 PROBLEM — K20.0 EOSINOPHILIC ESOPHAGITIS: Status: ACTIVE | Noted: 2025-08-06

## 2025-08-06 PROBLEM — R89.8 ABNORMAL BONE MARROW EXAMINATION: Status: ACTIVE | Noted: 2025-08-06

## 2025-08-06 PROBLEM — D47.4: Status: ACTIVE | Noted: 2025-08-06

## 2025-08-06 ASSESSMENT — ENCOUNTER SYMPTOMS
NEUROLOGICAL NEGATIVE: 1
ENDOCRINE NEGATIVE: 1
HEMATOLOGIC/LYMPHATIC NEGATIVE: 1
RESPIRATORY NEGATIVE: 1
EYES NEGATIVE: 1
CONSTITUTIONAL NEGATIVE: 1
GASTROINTESTINAL NEGATIVE: 1
CARDIOVASCULAR NEGATIVE: 1
PSYCHIATRIC NEGATIVE: 1
BACK PAIN: 1

## 2025-08-06 NOTE — PROGRESS NOTES
"Patient ID: Sammy Beth is a 69 y.o. male.  Referring Physician: Abhi Borges MD  Primary Care Provider: Hung Urban MD  Visit Type: Initial Visit      Subjective    HPI I was given this prescription from Dr Borges that says \"Consult heterogeneous bone marrow pattern sacrum/ilium on lumbar MRI 3/31/2025\"    Review of Systems   Constitutional: Negative.    HENT:  Negative.     Eyes: Negative.    Respiratory: Negative.     Cardiovascular: Negative.    Gastrointestinal: Negative.    Endocrine: Negative.    Genitourinary: Negative.     Musculoskeletal:  Positive for back pain.   Skin: Negative.    Neurological: Negative.    Hematological: Negative.    Psychiatric/Behavioral: Negative.          Objective   BSA: 2.04 meters squared  /70 (BP Location: Right arm)   Pulse (!) 45 Comment: Pt states his HR always runs low. Reported to Katina Sellers RN ()  Temp 36 °C (96.8 °F) (Temporal)   Resp 16   Wt 83 kg (182 lb 15.7 oz)   SpO2 95%   BMI 25.52 kg/m²      has a past medical history of Acute lateral meniscus tear of left knee (10/09/2023), Acute left-sided low back pain without sciatica (08/26/2015), Arthritis, Bradycardia, Degeneration of lumbar intervertebral disc (08/26/2015), Derangement of left knee (03/08/2024), Hypotension, Knee pain (02/21/2022), Laceration of finger (05/05/2022), Left ear pain (03/08/2024), Lumbar spondylosis (01/30/2024), Lumbosacral spondylosis without myelopathy (01/15/2018), Mass of parotid gland (10/07/2023), Neck pain (07/13/2022), Numbness and tingling, Pain in both hands (03/08/2024), Pain in thoracic spine (01/30/2024), Personal history of other diseases of male genital organs, Personal history of other diseases of the digestive system, Postoperative pain (03/08/2024), Primary localized osteoarthrosis of shoulder region (05/22/2018), Primary osteoarthritis of both hands (11/13/2023), Snores, Sore throat (09/05/2023), Sprain of metacarpophalangeal joint (07/22/2019), " "and Thyroid nodule.   has a past surgical history that includes Foot surgery (Left); MR angio neck wo IV contrast (10/26/2022); Tonsillectomy; Knee surgery (Right); Shoulder surgery (Right); LASIK (Bilateral); IR biopsy neck thyroid; and Vasectomy.  Family History[1]  Mother had heart disease  Brother has prostate cancer  Oncology History    No history exists.       Shaan Beth \"Sammy\"  reports that he has quit smoking. His smoking use included cigarettes. He has a 16 pack-year smoking history. He has never used smokeless tobacco.  He  reports current alcohol use.  He  reports no history of drug use.    Physical Exam  Vitals reviewed.   Constitutional:       Appearance: Normal appearance.   HENT:      Head: Normocephalic.      Mouth/Throat:      Mouth: Mucous membranes are moist.     Eyes:      Extraocular Movements: Extraocular movements intact.      Pupils: Pupils are equal, round, and reactive to light.       Cardiovascular:      Rate and Rhythm: Normal rate and regular rhythm.      Pulses: Normal pulses.      Heart sounds: Normal heart sounds.   Pulmonary:      Breath sounds: Normal breath sounds.   Abdominal:      Palpations: Abdomen is soft.     Musculoskeletal:         General: Normal range of motion.      Cervical back: Normal range of motion and neck supple.     Skin:     General: Skin is warm.     Neurological:      General: No focal deficit present.      Mental Status: He is alert and oriented to person, place, and time.     Psychiatric:         Mood and Affect: Mood normal.         Behavior: Behavior normal.         WBC   Date/Time Value Ref Range Status   07/25/2025 04:22 PM 6.7 4.4 - 11.3 x10*3/uL Final   11/01/2023 09:10 AM 5.7 4.4 - 11.3 x10*3/uL Final     nRBC   Date Value Ref Range Status   07/25/2025   Final     Comment:     Not Measured   11/01/2023 0.0 0.0 - 0.0 /100 WBCs Final     RBC   Date Value Ref Range Status   07/25/2025 4.44 (L) 4.50 - 5.90 x10*6/uL Final   11/01/2023 4.97 4.50 - 5.90 " x10*6/uL Final     Hemoglobin   Date Value Ref Range Status   07/25/2025 13.7 13.5 - 17.5 g/dL Final   11/01/2023 14.9 13.5 - 17.5 g/dL Final     Hematocrit   Date Value Ref Range Status   07/25/2025 41.1 41.0 - 52.0 % Final   11/01/2023 45.3 41.0 - 52.0 % Final     MCV   Date/Time Value Ref Range Status   07/25/2025 04:22 PM 93 80 - 100 fL Final   11/01/2023 09:10 AM 91 80 - 100 fL Final     MCH   Date/Time Value Ref Range Status   07/25/2025 04:22 PM 30.9 26.0 - 34.0 pg Final   11/01/2023 09:10 AM 30.0 26.0 - 34.0 pg Final     MCHC   Date/Time Value Ref Range Status   07/25/2025 04:22 PM 33.3 32.0 - 36.0 g/dL Final   11/01/2023 09:10 AM 32.9 32.0 - 36.0 g/dL Final     RDW   Date/Time Value Ref Range Status   07/25/2025 04:22 PM 12.5 11.5 - 14.5 % Final   11/01/2023 09:10 AM 12.4 11.5 - 14.5 % Final     Platelets   Date/Time Value Ref Range Status   07/25/2025 04:22  150 - 450 x10*3/uL Final   11/01/2023 09:10  150 - 450 x10*3/uL Final     MPV   Date/Time Value Ref Range Status   11/01/2023 09:10 AM 10.2 7.5 - 11.5 fL Final     Neutrophils %   Date/Time Value Ref Range Status   07/25/2025 04:22 PM 65.3 40.0 - 80.0 % Final   11/01/2023 09:10 AM 57.3 40.0 - 80.0 % Final     Immature Granulocytes %, Automated   Date/Time Value Ref Range Status   07/25/2025 04:22 PM 0.0 0.0 - 0.9 % Final     Comment:     Immature Granulocyte Count (IG) includes promyelocytes, myelocytes and metamyelocytes but does not include bands. Percent differential counts (%) should be interpreted in the context of the absolute cell counts (cells/UL).   11/01/2023 09:10 AM 0.2 0.0 - 0.9 % Final     Comment:     Immature Granulocyte Count (IG) includes promyelocytes, myelocytes and metamyelocytes but does not include bands. Percent differential counts (%) should be interpreted in the context of the absolute cell counts (cells/UL).     Lymphocytes %   Date/Time Value Ref Range Status   07/25/2025 04:22 PM 27.2 13.0 - 44.0 % Final  "  11/01/2023 09:10 AM 31.4 13.0 - 44.0 % Final     Monocytes %   Date/Time Value Ref Range Status   07/25/2025 04:22 PM 5.3 2.0 - 10.0 % Final   11/01/2023 09:10 AM 6.5 2.0 - 10.0 % Final     Eosinophils %   Date/Time Value Ref Range Status   07/25/2025 04:22 PM 1.7 0.0 - 6.0 % Final   11/01/2023 09:10 AM 3.5 0.0 - 6.0 % Final     Basophils %   Date/Time Value Ref Range Status   07/25/2025 04:22 PM 0.5 0.0 - 2.0 % Final   11/01/2023 09:10 AM 1.1 0.0 - 2.0 % Final     Neutrophils Absolute   Date/Time Value Ref Range Status   07/25/2025 04:22 PM 4.35 1.20 - 7.70 x10*3/uL Final     Comment:     Percent differential counts (%) should be interpreted in the context of the absolute cell counts (cells/uL).   11/01/2023 09:10 AM 3.24 1.20 - 7.70 x10*3/uL Final     Comment:     Percent differential counts (%) should be interpreted in the context of the absolute cell counts (cells/uL).     Immature Granulocytes Absolute, Automated   Date/Time Value Ref Range Status   07/25/2025 04:22 PM 0.00 0.00 - 0.70 x10*3/uL Final   11/01/2023 09:10 AM 0.01 0.00 - 0.70 x10*3/uL Final     Lymphocytes Absolute   Date/Time Value Ref Range Status   07/25/2025 04:22 PM 1.81 1.20 - 4.80 x10*3/uL Final   11/01/2023 09:10 AM 1.78 1.20 - 4.80 x10*3/uL Final     Monocytes Absolute   Date/Time Value Ref Range Status   07/25/2025 04:22 PM 0.35 0.10 - 1.00 x10*3/uL Final   11/01/2023 09:10 AM 0.37 0.10 - 1.00 x10*3/uL Final     Eosinophils Absolute   Date/Time Value Ref Range Status   07/25/2025 04:22 PM 0.11 0.00 - 0.70 x10*3/uL Final   11/01/2023 09:10 AM 0.20 0.00 - 0.70 x10*3/uL Final     Basophils Absolute   Date/Time Value Ref Range Status   07/25/2025 04:22 PM 0.03 0.00 - 0.10 x10*3/uL Final   11/01/2023 09:10 AM 0.06 0.00 - 0.10 x10*3/uL Final       No components found for: \"PT\"  No results found for: \"APTT\"  Medication Documentation Review Audit       Reviewed by Ashlie Morillo MA (Medical Assistant) on 07/25/25 at 1546      Medication Order " "Taking? Sig Documenting Provider Last Dose Status   fluticasone (Flonase) 50 mcg/actuation nasal spray [id number] Yes Administer 1 spray into each nostril once daily. Shake gently. Before first use, prime pump. After use, clean tip and replace cap. Historical Provider, MD  Active   ibuprofen 800 mg tablet [id number] Yes prn Historical Provider, MD  Active   zolpidem CR (Ambien CR) 12.5 mg ER tablet [id number] Yes Take 6 mg by mouth as needed at bedtime for sleep. Do not crush, chew, or split. Historical Provider, MD  Active                   Assessment/Plan    1) \"Heterogeneous bone marrow pattern\"  -10/26/2022 MR C spine: marrow signal is mildly inhomogeneous; mild degenerative changes of the cervical spine without significant central canal stenosis; degenerative facet arthropathy is most severe on the left at C2-3  -1/23/2024 lumbar XR: mild spondylosis progressed  -3/31/2025 lumbar XR: multilevel degenerative disc disease severe at L2-L3  -3/31/2025 MR lumbar: there is disc dessication with disc narrowing and osteophytosis throughout the lumbar spine severe at L2-L3 and mild throughout the rest of the lumbar spine; mild central stenosis at L2-L3 and L4-L5; multilevel foraminal stenosis severe on the left side at L5-S1  -MRI official report itself makes absolutely no mention of \"heterogeneous bone marrow pattern in sacrum/ilium\"  -I reviewed his lab history in the EMR  -10/29/2018 creatinine 1.1, calcium 8.9  -11/4/2019 PSA 2.0, creatinine 1.0, calcium 8.9  -5/25/2021 creatinine 1.3, calcium 9.4  -10/28/2021 PSA 2.61  -3/29/2022 creatinine 1.1, calcium 9.5, wbc 5.8, hgb 14.2, plt 172,000  -10/6/2022 creatinine 1.1, calcium 9.0, PSA 2.4, wbc 4.9, hgb 14.5, plt 185,000  -11/3/2022 PSA 2.8  -11/1/2023 creatinine 1.23, calcium 9.3, alk phos 93, AST 29, total bili 0.4, ALT 40, wbc 5.7, hgb 14.9, plt 211,000, wbc 5.7, hgb 15.3, plt 175,000  -2/2/2024 creatinine 1.14, calcium 9.0, wbc 7.5, hgb 14.1, plt 217,000  -3/17/2025 " PSA 2.65, creatinine 1.16, calcium 8.5, wbc 5.1, hgb 13.7, plt 168,000  -reviewed imaging reports--no mention of lytic or blastic lesions  -heterogeneous marrow signal is a very nonspecific term that is used to describe both benign and malignant conditions: benign conditions include normal age related changes, focal nodular marrow hyperplasia, bone marrow conversion and degenerative changes; malignant conditions include multiple myeloma, lymphoma, myeloproliferative disorders  -CBC/diff is completely normal, so MPN unlikely  -will check CBC, COMP, screen for plasma cell dyscrasia and also screen for myeloproliferative disorder    2) lower back pain  -consulted with Dr Borges    3) allergic rhinitis  -on flonase    4) eosinophilic esophagitis  -follows with GI   -last EGD done by Dr Orquidea Garduno was on on 4/24/2024- abnormal mucosa consistent with eosinophilic esophagitis in the middle third of the esophagus; stomach and duodenum appeared normal; path: eosinophil rich esophagitis     Problem List Items Addressed This Visit    None  Visit Diagnoses         Codes      Abnormal bone marrow examination    -  Primary R89.8    Relevant Orders    CBC and Auto Differential (Completed)    Comprehensive Metabolic Panel (Completed)    Serum Protein Electrophoresis (Completed)    Immunoglobulins (IgG, IgA, IgM) (Completed)    Chrisney/Lambda Free Light Chain, Serum (Completed)    Myeloid Malignancies Panel (Completed)    Clinic Appointment Request Virtual Est; [place]; [facility]      Osteomyelofibrosis (Multi)     D47.4    Relevant Orders    Myeloid Malignancies Panel (Completed)    Clinic Appointment Request Virtual Est; [place]; [facility]                 [place] MD                              [1]   Family History  Problem Relation Name Age of Onset    Hypertension Mother      Other (non-contributory) Mother      COPD Father      Other (non-contributory) Father

## 2025-08-11 ENCOUNTER — TELEMEDICINE (OUTPATIENT)
Dept: HEMATOLOGY/ONCOLOGY | Facility: CLINIC | Age: 70
End: 2025-08-11
Payer: MEDICARE

## 2025-08-11 DIAGNOSIS — M54.50 LOW BACK PAIN WITHOUT SCIATICA, UNSPECIFIED BACK PAIN LATERALITY, UNSPECIFIED CHRONICITY: ICD-10-CM

## 2025-08-11 DIAGNOSIS — D47.4: ICD-10-CM

## 2025-08-11 DIAGNOSIS — J30.2 SEASONAL ALLERGIC RHINITIS, UNSPECIFIED TRIGGER: ICD-10-CM

## 2025-08-11 DIAGNOSIS — K20.0 EOSINOPHILIC ESOPHAGITIS: ICD-10-CM

## 2025-08-11 DIAGNOSIS — R89.8 ABNORMAL BONE MARROW EXAMINATION: Primary | ICD-10-CM

## 2025-08-11 PROCEDURE — 99213 OFFICE O/P EST LOW 20 MIN: CPT | Performed by: INTERNAL MEDICINE

## 2025-08-11 PROCEDURE — 1159F MED LIST DOCD IN RCRD: CPT | Performed by: INTERNAL MEDICINE

## 2025-08-11 PROCEDURE — 1160F RVW MEDS BY RX/DR IN RCRD: CPT | Performed by: INTERNAL MEDICINE

## 2025-08-26 ASSESSMENT — ENCOUNTER SYMPTOMS
BACK PAIN: 1
NEUROLOGICAL NEGATIVE: 1
CARDIOVASCULAR NEGATIVE: 1
PSYCHIATRIC NEGATIVE: 1
CONSTITUTIONAL NEGATIVE: 1
GASTROINTESTINAL NEGATIVE: 1
RESPIRATORY NEGATIVE: 1
EYES NEGATIVE: 1
HEMATOLOGIC/LYMPHATIC NEGATIVE: 1
ENDOCRINE NEGATIVE: 1

## (undated) DEVICE — CUFF, TOURNIQUET, DUAL PORT, SNG BLADDER, 30 IN, PLC

## (undated) DEVICE — HOLSTER, ELECTROSURGERY ACCESSORY, STERILE

## (undated) DEVICE — GLOVE, SURGICAL, PROTEXIS PI BLUE W/NEUTHERA, 7.0, PF, LF

## (undated) DEVICE — Device

## (undated) DEVICE — BOWL, BASIN, 32 OZ, STERILE

## (undated) DEVICE — TUBING, SUCTION, 6MM X 10, CLEAN N-COND

## (undated) DEVICE — GLOVE, SURGICAL, PROTEXIS PI , 7.0, PF, LF

## (undated) DEVICE — STRAP, ARM BOARD, 32 X 1.5

## (undated) DEVICE — STRAP, VELCRO, BODY, 4 X 60IN, NS

## (undated) DEVICE — SUTURE, ETHILON, 3-0, 18 IN, PS1, BLACK

## (undated) DEVICE — PADDING, CAST, SPECIALIST, 6 IN X 4 YD, STERILE

## (undated) DEVICE — BANDAGE, ELASTIC, MATRIX, SELF-CLOSURE, 6 IN X 5 YD, LF

## (undated) DEVICE — DRESSING, GAUZE, PETROLATUM, STRIP, XEROFORM, 1 X 8 IN, STERILE

## (undated) DEVICE — COVER, MAYO STAND, W/PAD, 23 IN, DISPOSABLE, PLASTIC, LF, STERILE

## (undated) DEVICE — DRESSING, ABDOMINAL PAD, CURITY, 7.5 X 8 IN

## (undated) DEVICE — TUBING, PATIENT 8FT STERILE

## (undated) DEVICE — GLOVE, SURGICAL, PROTEXIS PI , 7.5, PF, LF

## (undated) DEVICE — PREP, IODOPHOR, W/ALCOHOL, DURAPREP, W/APPLICATOR, 26 CC

## (undated) DEVICE — SOLUTION, IRRIGATION, USP, SODIUM CHLORIDE 0.9%, 3000 ML

## (undated) DEVICE — MAT, FLOOR, STANDARD FLUID BARRIER, 32X44, GREEN